# Patient Record
Sex: FEMALE | Race: WHITE | Employment: UNEMPLOYED | URBAN - METROPOLITAN AREA
[De-identification: names, ages, dates, MRNs, and addresses within clinical notes are randomized per-mention and may not be internally consistent; named-entity substitution may affect disease eponyms.]

---

## 2017-03-03 ENCOUNTER — OFFICE VISIT (OUTPATIENT)
Dept: INTERNAL MEDICINE CLINIC | Age: 42
End: 2017-03-03

## 2017-03-03 VITALS
WEIGHT: 224 LBS | HEART RATE: 85 BPM | RESPIRATION RATE: 18 BRPM | DIASTOLIC BLOOD PRESSURE: 88 MMHG | HEIGHT: 66 IN | TEMPERATURE: 97.8 F | OXYGEN SATURATION: 98 % | BODY MASS INDEX: 36 KG/M2 | SYSTOLIC BLOOD PRESSURE: 131 MMHG

## 2017-03-03 DIAGNOSIS — E66.9 OBESITY (BMI 35.0-39.9 WITHOUT COMORBIDITY): ICD-10-CM

## 2017-03-03 DIAGNOSIS — Z01.419 WELL WOMAN EXAM: ICD-10-CM

## 2017-03-03 DIAGNOSIS — R10.11 RUQ PAIN: ICD-10-CM

## 2017-03-03 DIAGNOSIS — Z76.89 ENCOUNTER TO ESTABLISH CARE WITH NEW DOCTOR: ICD-10-CM

## 2017-03-03 DIAGNOSIS — Z01.419 WELL WOMAN EXAM: Primary | ICD-10-CM

## 2017-03-03 LAB
A/G RATIO: 1.3 (ref 1.1–2.2)
ALBUMIN SERPL-MCNC: 4.4 G/DL (ref 3.4–5)
ALP BLD-CCNC: 85 U/L (ref 40–129)
ALT SERPL-CCNC: 26 U/L (ref 10–40)
ANION GAP SERPL CALCULATED.3IONS-SCNC: 20 MMOL/L (ref 3–16)
AST SERPL-CCNC: 18 U/L (ref 15–37)
BILIRUB SERPL-MCNC: <0.2 MG/DL (ref 0–1)
BUN BLDV-MCNC: 10 MG/DL (ref 7–20)
CALCIUM SERPL-MCNC: 9.5 MG/DL (ref 8.3–10.6)
CHLORIDE BLD-SCNC: 102 MMOL/L (ref 99–110)
CHOLESTEROL, TOTAL: 251 MG/DL (ref 0–199)
CO2: 20 MMOL/L (ref 21–32)
CREAT SERPL-MCNC: 0.7 MG/DL (ref 0.6–1.1)
GFR AFRICAN AMERICAN: >60
GFR NON-AFRICAN AMERICAN: >60
GLOBULIN: 3.4 G/DL
GLUCOSE BLD-MCNC: 96 MG/DL (ref 70–99)
HDLC SERPL-MCNC: 43 MG/DL (ref 40–60)
LDL CHOLESTEROL CALCULATED: 171 MG/DL
POTASSIUM SERPL-SCNC: 4.4 MMOL/L (ref 3.5–5.1)
SODIUM BLD-SCNC: 142 MMOL/L (ref 136–145)
T4 FREE: 0.7 NG/DL (ref 0.9–1.8)
TOTAL PROTEIN: 7.8 G/DL (ref 6.4–8.2)
TRIGL SERPL-MCNC: 183 MG/DL (ref 0–150)
TSH REFLEX FT4: 6 UIU/ML (ref 0.27–4.2)
VLDLC SERPL CALC-MCNC: 37 MG/DL

## 2017-03-03 PROCEDURE — 99396 PREV VISIT EST AGE 40-64: CPT | Performed by: INTERNAL MEDICINE

## 2017-03-03 RX ORDER — NITROFURANTOIN 25; 75 MG/1; MG/1
CAPSULE ORAL
COMMUNITY
Start: 2017-01-19 | End: 2020-11-09

## 2017-03-06 PROBLEM — E78.2 MIXED HYPERLIPIDEMIA: Status: ACTIVE | Noted: 2017-03-06

## 2017-03-06 PROBLEM — E03.9 HYPOTHYROIDISM (ACQUIRED): Status: ACTIVE | Noted: 2017-03-06

## 2017-04-17 ENCOUNTER — TELEPHONE (OUTPATIENT)
Dept: BARIATRICS/WEIGHT MGMT | Age: 42
End: 2017-04-17

## 2018-04-06 DIAGNOSIS — E03.9 HYPOTHYROIDISM (ACQUIRED): Primary | ICD-10-CM

## 2018-04-06 RX ORDER — LEVOTHYROXINE SODIUM 75 UG/1
75 CAPSULE ORAL DAILY
Qty: 90 CAPSULE | Refills: 1 | Status: SHIPPED | OUTPATIENT
Start: 2018-04-06 | End: 2018-09-07 | Stop reason: SDUPTHER

## 2018-04-16 ENCOUNTER — OFFICE VISIT (OUTPATIENT)
Dept: INTERNAL MEDICINE CLINIC | Age: 43
End: 2018-04-16

## 2018-04-16 VITALS
WEIGHT: 217 LBS | RESPIRATION RATE: 16 BRPM | DIASTOLIC BLOOD PRESSURE: 74 MMHG | HEIGHT: 65 IN | HEART RATE: 79 BPM | SYSTOLIC BLOOD PRESSURE: 115 MMHG | BODY MASS INDEX: 36.15 KG/M2

## 2018-04-16 DIAGNOSIS — Z00.00 WELL ADULT EXAM: Primary | ICD-10-CM

## 2018-04-16 PROCEDURE — 99396 PREV VISIT EST AGE 40-64: CPT | Performed by: INTERNAL MEDICINE

## 2018-04-16 RX ORDER — LORATADINE 10 MG/1
10 TABLET ORAL DAILY
COMMUNITY

## 2018-04-16 RX ORDER — LEVOTHYROXINE SODIUM 0.07 MG/1
TABLET ORAL
COMMUNITY
Start: 2018-04-08 | End: 2018-04-16 | Stop reason: SDUPTHER

## 2018-04-16 ASSESSMENT — PATIENT HEALTH QUESTIONNAIRE - PHQ9
1. LITTLE INTEREST OR PLEASURE IN DOING THINGS: 0
SUM OF ALL RESPONSES TO PHQ9 QUESTIONS 1 & 2: 0
2. FEELING DOWN, DEPRESSED OR HOPELESS: 0
SUM OF ALL RESPONSES TO PHQ QUESTIONS 1-9: 0

## 2018-07-03 DIAGNOSIS — Z00.00 WELL ADULT EXAM: ICD-10-CM

## 2018-07-03 LAB
A/G RATIO: 1.3 (ref 1.1–2.2)
ALBUMIN SERPL-MCNC: 4.1 G/DL (ref 3.4–5)
ALP BLD-CCNC: 70 U/L (ref 40–129)
ALT SERPL-CCNC: 20 U/L (ref 10–40)
ANION GAP SERPL CALCULATED.3IONS-SCNC: 12 MMOL/L (ref 3–16)
AST SERPL-CCNC: 12 U/L (ref 15–37)
BASOPHILS ABSOLUTE: 0.1 K/UL (ref 0–0.2)
BASOPHILS RELATIVE PERCENT: 0.7 %
BILIRUB SERPL-MCNC: 0.3 MG/DL (ref 0–1)
BUN BLDV-MCNC: 12 MG/DL (ref 7–20)
CALCIUM SERPL-MCNC: 9.4 MG/DL (ref 8.3–10.6)
CHLORIDE BLD-SCNC: 103 MMOL/L (ref 99–110)
CHOLESTEROL, TOTAL: 188 MG/DL (ref 0–199)
CO2: 23 MMOL/L (ref 21–32)
CREAT SERPL-MCNC: 0.7 MG/DL (ref 0.6–1.1)
EOSINOPHILS ABSOLUTE: 0.2 K/UL (ref 0–0.6)
EOSINOPHILS RELATIVE PERCENT: 2.5 %
GFR AFRICAN AMERICAN: >60
GFR NON-AFRICAN AMERICAN: >60
GLOBULIN: 3.2 G/DL
GLUCOSE BLD-MCNC: 91 MG/DL (ref 70–99)
HCT VFR BLD CALC: 38.5 % (ref 36–48)
HDLC SERPL-MCNC: 32 MG/DL (ref 40–60)
HEMOGLOBIN: 12.9 G/DL (ref 12–16)
LDL CHOLESTEROL CALCULATED: 127 MG/DL
LYMPHOCYTES ABSOLUTE: 2.2 K/UL (ref 1–5.1)
LYMPHOCYTES RELATIVE PERCENT: 27.5 %
MCH RBC QN AUTO: 28.4 PG (ref 26–34)
MCHC RBC AUTO-ENTMCNC: 33.5 G/DL (ref 31–36)
MCV RBC AUTO: 84.7 FL (ref 80–100)
MONOCYTES ABSOLUTE: 0.5 K/UL (ref 0–1.3)
MONOCYTES RELATIVE PERCENT: 6.2 %
NEUTROPHILS ABSOLUTE: 5.1 K/UL (ref 1.7–7.7)
NEUTROPHILS RELATIVE PERCENT: 63.1 %
PDW BLD-RTO: 14.4 % (ref 12.4–15.4)
PLATELET # BLD: 287 K/UL (ref 135–450)
PMV BLD AUTO: 9.3 FL (ref 5–10.5)
POTASSIUM SERPL-SCNC: 4.4 MMOL/L (ref 3.5–5.1)
RBC # BLD: 4.54 M/UL (ref 4–5.2)
SODIUM BLD-SCNC: 138 MMOL/L (ref 136–145)
TOTAL PROTEIN: 7.3 G/DL (ref 6.4–8.2)
TRIGL SERPL-MCNC: 144 MG/DL (ref 0–150)
TSH REFLEX FT4: 2.72 UIU/ML (ref 0.27–4.2)
VLDLC SERPL CALC-MCNC: 29 MG/DL
WBC # BLD: 8.1 K/UL (ref 4–11)

## 2018-07-04 LAB
ESTIMATED AVERAGE GLUCOSE: 102.5 MG/DL
HBA1C MFR BLD: 5.2 %

## 2018-08-08 ENCOUNTER — HOSPITAL ENCOUNTER (OUTPATIENT)
Dept: MAMMOGRAPHY | Age: 43
Discharge: HOME | End: 2018-08-08
Attending: OBSTETRICS & GYNECOLOGY | Admitting: OBSTETRICS & GYNECOLOGY

## 2018-08-08 DIAGNOSIS — Z12.31 VISIT FOR SCREENING MAMMOGRAM: ICD-10-CM

## 2019-12-16 DIAGNOSIS — E03.9 HYPOTHYROIDISM (ACQUIRED): ICD-10-CM

## 2019-12-19 RX ORDER — LEVOTHYROXINE SODIUM 0.07 MG/1
TABLET ORAL
Qty: 90 TABLET | Refills: 2 | OUTPATIENT
Start: 2019-12-19

## 2020-11-09 ENCOUNTER — APPOINTMENT (OUTPATIENT)
Dept: GENERAL RADIOLOGY | Age: 45
End: 2020-11-09
Payer: COMMERCIAL

## 2020-11-09 ENCOUNTER — HOSPITAL ENCOUNTER (EMERGENCY)
Age: 45
Discharge: HOME | End: 2020-11-09
Attending: EMERGENCY MEDICINE
Payer: COMMERCIAL

## 2020-11-09 VITALS
RESPIRATION RATE: 14 BRPM | TEMPERATURE: 98.6 F | WEIGHT: 210 LBS | BODY MASS INDEX: 34.99 KG/M2 | HEIGHT: 65 IN | OXYGEN SATURATION: 98 % | SYSTOLIC BLOOD PRESSURE: 146 MMHG | HEART RATE: 70 BPM | DIASTOLIC BLOOD PRESSURE: 83 MMHG

## 2020-11-09 PROCEDURE — 90715 TDAP VACCINE 7 YRS/> IM: CPT | Performed by: EMERGENCY MEDICINE

## 2020-11-09 PROCEDURE — 90471 IMMUNIZATION ADMIN: CPT | Performed by: EMERGENCY MEDICINE

## 2020-11-09 PROCEDURE — 73130 X-RAY EXAM OF HAND: CPT

## 2020-11-09 PROCEDURE — 6370000000 HC RX 637 (ALT 250 FOR IP): Performed by: EMERGENCY MEDICINE

## 2020-11-09 PROCEDURE — 99283 EMERGENCY DEPT VISIT LOW MDM: CPT

## 2020-11-09 PROCEDURE — 6360000002 HC RX W HCPCS: Performed by: EMERGENCY MEDICINE

## 2020-11-09 RX ORDER — HYDROCODONE BITARTRATE AND ACETAMINOPHEN 5; 325 MG/1; MG/1
1 TABLET ORAL ONCE
Status: COMPLETED | OUTPATIENT
Start: 2020-11-09 | End: 2020-11-09

## 2020-11-09 RX ORDER — IBUPROFEN 400 MG/1
400 TABLET ORAL EVERY 6 HOURS PRN
Qty: 30 TABLET | Refills: 0 | Status: SHIPPED | OUTPATIENT
Start: 2020-11-09

## 2020-11-09 RX ADMIN — TETANUS TOXOID, REDUCED DIPHTHERIA TOXOID AND ACELLULAR PERTUSSIS VACCINE, ADSORBED 0.5 ML: 5; 2.5; 8; 8; 2.5 SUSPENSION INTRAMUSCULAR at 18:33

## 2020-11-09 RX ADMIN — HYDROCODONE BITARTRATE AND ACETAMINOPHEN 1 TABLET: 5; 325 TABLET ORAL at 19:48

## 2020-11-09 ASSESSMENT — PAIN DESCRIPTION - PAIN TYPE
TYPE: ACUTE PAIN
TYPE: ACUTE PAIN

## 2020-11-09 ASSESSMENT — PAIN DESCRIPTION - LOCATION
LOCATION: FINGER (COMMENT WHICH ONE)
LOCATION: OTHER (COMMENT)

## 2020-11-09 ASSESSMENT — PAIN DESCRIPTION - ORIENTATION
ORIENTATION: LEFT
ORIENTATION: LEFT

## 2020-11-09 ASSESSMENT — PAIN - FUNCTIONAL ASSESSMENT
PAIN_FUNCTIONAL_ASSESSMENT: 0-10
PAIN_FUNCTIONAL_ASSESSMENT: PREVENTS OR INTERFERES SOME ACTIVE ACTIVITIES AND ADLS

## 2020-11-09 ASSESSMENT — PAIN SCALES - GENERAL
PAINLEVEL_OUTOF10: 6
PAINLEVEL_OUTOF10: 5
PAINLEVEL_OUTOF10: 5

## 2020-11-09 ASSESSMENT — PAIN DESCRIPTION - FREQUENCY: FREQUENCY: CONTINUOUS

## 2020-11-09 ASSESSMENT — PAIN DESCRIPTION - DESCRIPTORS: DESCRIPTORS: SHARP

## 2020-11-09 NOTE — ED PROVIDER NOTES
CHIEF COMPLAINT  Chief Complaint   Patient presents with    Fall     She was taking a walk and did not see a curve covered with leaves and fell forward injuring left 5th finger, and has upper lip abrasion, teeth intact, denies LOC, happened 30 minutes pta    Hand Injury       HISTORY OF PRESENT ILLNESS  Bekah Romero is a 54 y.o. female who presents to the ED complaining of injury to the left fifth digit when she fell and tried to catch herself with an outstretched hand. Patient reports pain and deformity of the left fifth digit primarily over the PIP joint with inability to extend the PIP joint at the time of evaluation. Patient denies pain of the remainder of the hand or wrist and her only other complaint is a abrasion to the upper lip without loose teeth. No loss of consciousness or headache. No otorrhea or rhinorrhea. No epistaxis or visual changes. No amnesia, nausea or vomiting. No neurologic deficits. No other complaints, modifying factors or associated symptoms. Nursing notes reviewed. History reviewed. No pertinent past medical history. History reviewed. No pertinent surgical history. History reviewed. No pertinent family history.   Social History     Socioeconomic History    Marital status:      Spouse name: Not on file    Number of children: Not on file    Years of education: Not on file    Highest education level: Not on file   Occupational History    Not on file   Social Needs    Financial resource strain: Not on file    Food insecurity     Worry: Not on file     Inability: Not on file    Transportation needs     Medical: Not on file     Non-medical: Not on file   Tobacco Use    Smoking status: Former Smoker     Types: Cigarettes     Last attempt to quit:      Years since quittin.8    Smokeless tobacco: Never Used   Substance and Sexual Activity    Alcohol use: Not on file     Comment: occasionally    Drug use: Never    Sexual activity: Not on file Lifestyle    Physical activity     Days per week: Not on file     Minutes per session: Not on file    Stress: Not on file   Relationships    Social connections     Talks on phone: Not on file     Gets together: Not on file     Attends [employer] service: Not on file     Active member of club or organization: Not on file     Attends meetings of clubs or organizations: Not on file     Relationship status: Not on file    Intimate partner violence     Fear of current or ex partner: Not on file     Emotionally abused: Not on file     Physically abused: Not on file     Forced sexual activity: Not on file   Other Topics Concern    Not on file   Social History Narrative    Not on file     No current facility-administered medications for this encounter. Current Outpatient Medications   Medication Sig Dispense Refill    ibuprofen (IBU) 400 MG tablet Take 1 tablet by mouth every 6 hours as needed for Pain 30 tablet 0    loratadine (CLARITIN) 10 MG tablet Take 10 mg by mouth daily      levothyroxine (SYNTHROID) 75 MCG tablet TAKE ONE TABLET BY MOUTH DAILY 90 tablet 2     No Known Allergies    REVIEW OF SYSTEMS  Positives and pertinent negatives as per HPI. Six other systems were reviewed and are negative. Nursing notes pertaining to ROS were reviewed. PHYSICAL EXAM   BP (!) 146/83   Pulse 70   Temp 98.6 °F (37 °C) (Oral)   Resp 14   Ht 5' 5\" (1.651 m)   Wt 210 lb (95.3 kg)   LMP 10/09/2020   SpO2 98%   BMI 34.95 kg/m²   GENERAL APPEARANCE: Awake and alert. Cooperative. No acute distress. HEAD: Normocephalic. Superficial abrasion to the midline upper lip with no evidence of fractured teeth and patient has a normal midface with no evidence of [place] fracture. Mild abrasion over the midline nasal bridge without nasal bone tenderness or crepitus. No septal hematoma or epistaxis. No mandible tenderness. No trismus. No hemotympanum. Negative Cormier's and raccoon sign.   EYES: PERRL. EOM's grossly intact. No scleral icterus, injection or exudate  ENT: Mucous membranes are moist.  NECK: Supple. Normal ROM. Nontender to palpation  CHEST:  Regular rate and rhythm, no murmurs, rubs or gallops  LUNGS: Breathing is unlabored. Speaking comfortably in full sentences. Clear through auscultation bilaterally  ABDOMEN: Nondistended, nontender  EXTREMITIES: No tenderness to palpation of the left shoulder, elbow or wrist.  Patient has no tenderness to palpation of the left hand or digits 1 through 4 but patient does have an abrasion with swelling and deformity over the left fifth PIP joint that is held in flexion at rest.  Patient's left PIP joint easily extends to full extension at the PIP joint but she is unable to maintain this position. SKIN: Warm and dry. NEUROLOGICAL: Alert and oriented. RADIOLOGY    XR HAND LEFT (MIN 3 VIEWS)   Final Result   5th PIP extensor tendon avulsion injury. ED COURSE/MDM  Lip abrasion: Wound care was provided in the emergency room and tetanus status was updated. No evidence of facial fracture or dental injury. Wound precautions were given    Left fifth digit PIP joint extensor tendon rupture: Patient's left PIP joint easily extends but does not remain in extension indicating a complete rupture of the extensor slip. Patient will be placed in a low form splint in position of function/extension. Ibuprofen as needed. RICE. Follow-up with Dr. Toro White from hand surgery in 24 to 48 hours. Hypertension:  Patient was hypertensive during the ER visit today. There are no signs of hypertensive emergency or evidence of end organ damage by history or physical exam.  These findings were discussed with the patient and advised to follow up with primary care physician to further assess and treat hypertension in the outpatient setting. The patient's blood pressure was found to be elevated according to CMS/Medicare and the Affordable Care Act/ObamaCare criteria.

## 2020-11-10 NOTE — ED NOTES
Gave patient discharge instructions.  She states, understanding patient discharged to home     Brennan Humphrey RN  11/09/20 1958

## 2020-11-10 NOTE — ED NOTES
Cleaned upper lip laceration and laceration left pinky finger. Applied triple ATB to both area. Applied non stick dressing, finger splint,  kerlix wrap, and ace wrap.  Patient c/o pain and requested pain medication       Charlie Jolley RN  11/09/20 [id number]

## 2020-11-10 NOTE — ED NOTES
Patient was going on a walk and did not see the curb covered with leaves and she fell forward, incident happened 30 minutes pta. C/o pain left pinky finger, unable to extended finger,  and noted skin abrasion to upper lip. Alert and oriented x 4.  Resp easy, Denies other injury     Medhat Howell RN  11/09/20 3509

## 2020-11-13 ENCOUNTER — TELEPHONE (OUTPATIENT)
Dept: ORTHOPEDIC SURGERY | Age: 45
End: 2020-11-13

## 2020-11-13 ENCOUNTER — OFFICE VISIT (OUTPATIENT)
Dept: ORTHOPEDIC SURGERY | Age: 45
End: 2020-11-13
Payer: COMMERCIAL

## 2020-11-13 VITALS — RESPIRATION RATE: 16 BRPM | WEIGHT: 210 LBS | TEMPERATURE: 98.1 F | BODY MASS INDEX: 34.99 KG/M2 | HEIGHT: 65 IN

## 2020-11-13 PROBLEM — M20.022 BOUTONNIERE DEFORMITY OF FINGER OF LEFT HAND: Status: ACTIVE | Noted: 2020-11-13

## 2020-11-13 PROCEDURE — 99203 OFFICE O/P NEW LOW 30 MIN: CPT | Performed by: PHYSICIAN ASSISTANT

## 2020-11-13 PROCEDURE — G8417 CALC BMI ABV UP PARAM F/U: HCPCS | Performed by: PHYSICIAN ASSISTANT

## 2020-11-13 PROCEDURE — G8427 DOCREV CUR MEDS BY ELIG CLIN: HCPCS | Performed by: PHYSICIAN ASSISTANT

## 2020-11-13 PROCEDURE — G8484 FLU IMMUNIZE NO ADMIN: HCPCS | Performed by: PHYSICIAN ASSISTANT

## 2020-11-13 PROCEDURE — 1036F TOBACCO NON-USER: CPT | Performed by: PHYSICIAN ASSISTANT

## 2020-11-13 NOTE — TELEPHONE ENCOUNTER
I called and left a  for patient to callback and discuss her therapy referral to Louann Joseph from [facility]. She can go to another therapist listed on the order if that works better with her insurance.

## 2020-11-13 NOTE — PATIENT INSTRUCTIONS
Thank you for choosing [facility] for your Hand and Upper Extremity needs. If we can be of any further assistance to you, please do not hesitate to contact us.     Office Phone Number:  [phone]  or  [phone]

## 2020-11-13 NOTE — PROGRESS NOTES
Ms. Catrina Law is a 54 y.o. right handed woman  who is seen today in Hand Surgical Consultation at the request of No primary care provider on file. .    She is seen today regarding an injury occurring on November 9th, 2020. She reports injuring her left Small Finger, having Tripped on a curb while walking. At the time of injury, there was malposition of the finger with a flexed PIP joint. She was seen for evaluation elsewhere, radiographs were obtained & she has been immobilized. She states that she took her splint off the day after she went to the ED because it was uncomfortable. She reports mild pain located in the distal aspect of the Small Finger, no tenderness of the remaining hand, wrist, or elbow. She notes today, no neurologic symptoms in the Small Finger. Symptoms show no change over time. I have today reviewed with Catrina Law the clinically relevant, past medical history, medications, allergies,  family history, social history, and Review Of Systems & I have documented any details relevant to today's presenting complaints in my history above. Ms. Jessica Duffy's self-reported past medical history, medications, allergies,  family history, social history, and Review Of Systems have been scanned into the chart under the \"Media\" tab. Physical Exam:  Ms. Marquita Rodríguez most recent vitals:  Vitals  Temp: 98.1 °F (36.7 °C)  Temp Source: Infrared  Resp: 16  Height: 5' 5\" (165.1 cm)  Weight: 210 lb (95.3 kg)    She is well nourished, oriented to person, place & time. She demonstrates appropriate mood and affect as well as normal gait and station. Skin: Normal in appearance, Normal Color and Free of Lesions Bilaterally   Digital range of motion is normal bilaterally except in the Small Finger where the PIP joint shows no active extension and sits in a flexed position. DIP joint sits in a hyperextended position, able to be passively flexed.  .   Wrist range of motion is Full and equal mal-union, non-union, delayed union, persistent deformity, persistent pain, limitation of motion, future arthritic symptoms & the possible need for further treatment. She understood our discussion and was comfortable with her decision; she was provided with appropriate expectations. .We discussed the option of pursuing formalized hand therapy and a prescription was provided to have a custom made splint made. Ms. Catrina Law. Is instructed in the continuous wear of the splint 24 hours a day without its removal for a period of 8 weeks. She is instructed to contact the office if she is unable to keep the splint in place or if it becomes dislodged, malpositioned, or otherwise unserviceable. I have asked her to schedule a follow-up appointment for 8 weeks from now at which time we will evaluate her healing. She is specifically instructed to contact the office between now & her scheduled appointment if she has concerns related to the splint or the underlying fracture. She is welcome to call for an appointment sooner if she has any additional concerns or questions. I have also discussed with Ms. Catrina Law  the other treatment options available to her  for this condition. We have today selected to proceed with conservative management. She and I have agreed that if our current course of conservative treatment does not prove to be effective over the short term future, that she will schedule a follow-up appointment to discuss and select an alternate course of therapy including possibly injection or surgical treatment. Ms. Catrina Law has been given a full verbal list of instructions and precautions related to her present condition. I have asked her to followup with me in the office at the prescribed time. She is also specifically requested to call or return to the office sooner if her symptoms change or worsen prior to the next scheduled appointment.

## 2020-11-16 NOTE — TELEPHONE ENCOUNTER
I spoke with patient and received her [email or web address] [email or web address] to send her other therapy locations.

## 2020-12-16 ENCOUNTER — OFFICE VISIT (OUTPATIENT)
Dept: ORTHOPEDIC SURGERY | Age: 45
End: 2020-12-16
Payer: COMMERCIAL

## 2020-12-16 VITALS — TEMPERATURE: 97.9 F | WEIGHT: 210 LBS | HEIGHT: 65 IN | BODY MASS INDEX: 34.99 KG/M2

## 2020-12-16 PROCEDURE — G8427 DOCREV CUR MEDS BY ELIG CLIN: HCPCS | Performed by: ORTHOPAEDIC SURGERY

## 2020-12-16 PROCEDURE — 99213 OFFICE O/P EST LOW 20 MIN: CPT | Performed by: ORTHOPAEDIC SURGERY

## 2020-12-16 PROCEDURE — G8417 CALC BMI ABV UP PARAM F/U: HCPCS | Performed by: ORTHOPAEDIC SURGERY

## 2020-12-16 PROCEDURE — 1036F TOBACCO NON-USER: CPT | Performed by: ORTHOPAEDIC SURGERY

## 2020-12-16 PROCEDURE — G8484 FLU IMMUNIZE NO ADMIN: HCPCS | Performed by: ORTHOPAEDIC SURGERY

## 2020-12-16 NOTE — PROGRESS NOTES
CHIEF COMPLAINT: Left hand pain/ short (pinkie) finger Boutonniere deformity. DATE OF INJURY: 2020    HISTORY:  Ms. Jacob Connors 54 y.o.  female right handed presents today for the first visit for evaluation of a left hand injury which occurred when she she fell walking her dog who was walking in leaves and did not know that the curb was there and tripped falling on her left hand. She initially presented to [facility] on 2020 where she was evaluated, x-rayed and instructed to follow-up with orthopedics. She did schedule an appointment with [place] Fournier who works with Dr. Wilberto Mcdonough and she sent her to physical therapy and was placed in a brace. She did not go to PT. She is here today with the same complaints. She is complaining of short (pinkie) finger pain and swelling. This is better with elevation and worse with ROM. Rates pain a 1/10 VAS with rest, but can increase to a 10/10 VAS when trying to bend or straighten her finger. The pain is sharp and not radiating. She is still wearing a finger brace. No other complaint. She denies smoking. History reviewed. No pertinent past medical history. History reviewed. No pertinent surgical history.     Social History     Socioeconomic History    Marital status:      Spouse name: Not on file    Number of children: Not on file    Years of education: Not on file    Highest education level: Not on file   Occupational History    Not on file   Social Needs    Financial resource strain: Not on file    Food insecurity     Worry: Not on file     Inability: Not on file    Transportation needs     Medical: Not on file     Non-medical: Not on file   Tobacco Use    Smoking status: Former Smoker     Types: Cigarettes     Quit date:      Years since quittin.9    Smokeless tobacco: Never Used   Substance and Sexual Activity    Alcohol use: Yes     Comment: occasionally    Drug use: Never    Sexual activity: Not on file   Lifestyle  Physical activity     Days per week: Not on file     Minutes per session: Not on file    Stress: Not on file   Relationships    Social connections     Talks on phone: Not on file     Gets together: Not on file     Attends [employer] service: Not on file     Active member of club or organization: Not on file     Attends meetings of clubs or organizations: Not on file     Relationship status: Not on file    Intimate partner violence     Fear of current or ex partner: Not on file     Emotionally abused: Not on file     Physically abused: Not on file     Forced sexual activity: Not on file   Other Topics Concern    Not on file   Social History Narrative    Not on file       History reviewed. No pertinent family history. Current Outpatient Medications on File Prior to Visit   Medication Sig Dispense Refill    ibuprofen (IBU) 400 MG tablet Take 1 tablet by mouth every 6 hours as needed for Pain 30 tablet 0    loratadine (CLARITIN) 10 MG tablet Take 10 mg by mouth daily       No current facility-administered medications on file prior to visit. Pertinent items are noted in HPI  Review of systems reviewed from Patient History Form dated on 11/13/2020 and available in the patient's chart under the Media tab. No change noted. PHYSICAL EXAMINATION:  Ms. Margarito العراقي is a very pleasant 54 y.o.  female who presents today in no acute distress, awake, alert, and oriented. She is well dressed, nourished and  groomed. Patient with normal affect. Height is  5' 5\" (1.651 m), weight is 210 lb (95.3 kg), Body mass index is 34.95 kg/m². Resting respiratory rate is 16. Examination of the gait, showed that the patient walks with no limp . Examination of both upper extremities showing a decreased range of motion of the left short (pinkie) finger compare to the other side. There is mild swelling that can be seen, as well as ecchymosis of the short (pinkie) finger. She has intact sensation and good radial pulses. She has moderate tenderness on deep palpation over the PIP jointphalanx of the short (pinkie) finger left hand. There is fixed deformity of the left PIP joint short (pinkie) finger. IMAGING: Flaquita Fisher were reviewed, dated 11/9/2020 taken in [facility],  3 views of the left hand, and showed 5th pinky finger PIP extensor tension avulsion injury. 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 PLAN:  I discussed that the findings and treatment options. At this point, recommend referral to Dr. Delgado Stewart for further treatment options. She states she has an appointment with [place] on 1/9/2021.       Stephania Archer MD

## 2020-12-26 ENCOUNTER — APPOINTMENT (OUTPATIENT)
Dept: GENERAL RADIOLOGY | Age: 45
DRG: 137 | End: 2020-12-26
Payer: COMMERCIAL

## 2020-12-26 ENCOUNTER — APPOINTMENT (OUTPATIENT)
Dept: CT IMAGING | Age: 45
DRG: 137 | End: 2020-12-26
Payer: COMMERCIAL

## 2020-12-26 ENCOUNTER — HOSPITAL ENCOUNTER (INPATIENT)
Age: 45
LOS: 5 days | Discharge: HOME | DRG: 137 | End: 2020-12-31
Attending: INTERNAL MEDICINE | Admitting: INTERNAL MEDICINE
Payer: COMMERCIAL

## 2020-12-26 DIAGNOSIS — R09.02 HYPOXEMIA REQUIRING SUPPLEMENTAL OXYGEN: ICD-10-CM

## 2020-12-26 DIAGNOSIS — Z99.81 HYPOXEMIA REQUIRING SUPPLEMENTAL OXYGEN: ICD-10-CM

## 2020-12-26 DIAGNOSIS — U07.1 COVID-19 VIRUS INFECTION: Primary | ICD-10-CM

## 2020-12-26 DIAGNOSIS — R06.09 DYSPNEA ON EXERTION: ICD-10-CM

## 2020-12-26 PROBLEM — J96.01 ACUTE RESPIRATORY FAILURE WITH HYPOXEMIA (HCC): Status: ACTIVE | Noted: 2020-12-26

## 2020-12-26 PROBLEM — J12.82 PNEUMONIA DUE TO 2019 NOVEL CORONAVIRUS: Status: ACTIVE | Noted: 2020-12-26

## 2020-12-26 LAB
A/G RATIO: 0.9 (ref 1.1–2.2)
ABO/RH: NORMAL
ALBUMIN SERPL-MCNC: 3.5 G/DL (ref 3.4–5)
ALP BLD-CCNC: 188 U/L (ref 40–129)
ALT SERPL-CCNC: 68 U/L (ref 10–40)
ANION GAP SERPL CALCULATED.3IONS-SCNC: 14 MMOL/L (ref 3–16)
ANTIBODY SCREEN: NORMAL
AST SERPL-CCNC: 64 U/L (ref 15–37)
BASE EXCESS VENOUS: 0.5 MMOL/L
BASOPHILS ABSOLUTE: 0 K/UL (ref 0–0.2)
BASOPHILS RELATIVE PERCENT: 0.1 %
BILIRUB SERPL-MCNC: 0.3 MG/DL (ref 0–1)
BILIRUBIN URINE: NEGATIVE
BLOOD, URINE: NEGATIVE
BUN BLDV-MCNC: 10 MG/DL (ref 7–20)
C-REACTIVE PROTEIN: 310.3 MG/L (ref 0–5.1)
CALCIUM SERPL-MCNC: 8.8 MG/DL (ref 8.3–10.6)
CARBOXYHEMOGLOBIN: 1.9 %
CHLORIDE BLD-SCNC: 96 MMOL/L (ref 99–110)
CLARITY: CLEAR
CO2: 22 MMOL/L (ref 21–32)
COLOR: YELLOW
CREAT SERPL-MCNC: <0.5 MG/DL (ref 0.6–1.1)
D DIMER: 3021 NG/ML DDU (ref 0–229)
EOSINOPHILS ABSOLUTE: 0 K/UL (ref 0–0.6)
EOSINOPHILS RELATIVE PERCENT: 0.2 %
EPITHELIAL CELLS, UA: 5 /HPF (ref 0–5)
FIBRINOGEN: 885 MG/DL (ref 200–397)
GFR AFRICAN AMERICAN: >60
GFR NON-AFRICAN AMERICAN: >60
GLOBULIN: 4.1 G/DL
GLUCOSE BLD-MCNC: 106 MG/DL (ref 70–99)
GLUCOSE URINE: NEGATIVE MG/DL
HCO3 VENOUS: 24 MMOL/L (ref 23–29)
HCT VFR BLD CALC: 34 % (ref 36–48)
HEMOGLOBIN: 10.9 G/DL (ref 12–16)
HYALINE CASTS: 4 /LPF (ref 0–8)
INR BLD: 1.2 (ref 0.86–1.14)
KETONES, URINE: ABNORMAL MG/DL
LACTIC ACID: 1.2 MMOL/L (ref 0.4–2)
LEUKOCYTE ESTERASE, URINE: NEGATIVE
LYMPHOCYTES ABSOLUTE: 0.7 K/UL (ref 1–5.1)
LYMPHOCYTES RELATIVE PERCENT: 6.3 %
MCH RBC QN AUTO: 25.9 PG (ref 26–34)
MCHC RBC AUTO-ENTMCNC: 32.1 G/DL (ref 31–36)
MCV RBC AUTO: 80.5 FL (ref 80–100)
METHEMOGLOBIN VENOUS: 0.2 %
MICROSCOPIC EXAMINATION: YES
MONOCYTES ABSOLUTE: 1 K/UL (ref 0–1.3)
MONOCYTES RELATIVE PERCENT: 8 %
NEUTROPHILS ABSOLUTE: 10.1 K/UL (ref 1.7–7.7)
NEUTROPHILS RELATIVE PERCENT: 85.4 %
NITRITE, URINE: NEGATIVE
O2 CONTENT, VEN: 12 ML/DL
O2 SAT, VEN: 75 %
O2 THERAPY: ABNORMAL
PCO2, VEN: 35.7 MMHG (ref 40–50)
PDW BLD-RTO: 14.8 % (ref 12.4–15.4)
PH UA: 6 (ref 5–8)
PH VENOUS: 7.44 (ref 7.35–7.45)
PLATELET # BLD: 438 K/UL (ref 135–450)
PMV BLD AUTO: 7.1 FL (ref 5–10.5)
PO2, VEN: 40 MMHG
POTASSIUM REFLEX MAGNESIUM: 3.8 MMOL/L (ref 3.5–5.1)
PRO-BNP: 71 PG/ML (ref 0–124)
PROCALCITONIN: 0.14 NG/ML (ref 0–0.15)
PROTEIN UA: 30 MG/DL
PROTHROMBIN TIME: 13.9 SEC (ref 10–13.2)
RBC # BLD: 4.22 M/UL (ref 4–5.2)
RBC UA: 6 /HPF (ref 0–4)
SARS-COV-2, NAAT: DETECTED
SODIUM BLD-SCNC: 132 MMOL/L (ref 136–145)
SPECIFIC GRAVITY UA: >1.03 (ref 1–1.03)
TCO2 CALC VENOUS: 25 MMOL/L
TOTAL PROTEIN: 7.6 G/DL (ref 6.4–8.2)
TROPONIN: <0.01 NG/ML
URINE TYPE: ABNORMAL
UROBILINOGEN, URINE: 0.2 E.U./DL
VITAMIN D 25-HYDROXY: 15.9 NG/ML
WBC # BLD: 11.8 K/UL (ref 4–11)
WBC UA: 5 /HPF (ref 0–5)

## 2020-12-26 PROCEDURE — 85379 FIBRIN DEGRADATION QUANT: CPT

## 2020-12-26 PROCEDURE — 96374 THER/PROPH/DIAG INJ IV PUSH: CPT

## 2020-12-26 PROCEDURE — 82306 VITAMIN D 25 HYDROXY: CPT

## 2020-12-26 PROCEDURE — U0002 COVID-19 LAB TEST NON-CDC: HCPCS

## 2020-12-26 PROCEDURE — 6360000002 HC RX W HCPCS: Performed by: INTERNAL MEDICINE

## 2020-12-26 PROCEDURE — 87449 NOS EACH ORGANISM AG IA: CPT

## 2020-12-26 PROCEDURE — 86901 BLOOD TYPING SEROLOGIC RH(D): CPT

## 2020-12-26 PROCEDURE — 85384 FIBRINOGEN ACTIVITY: CPT

## 2020-12-26 PROCEDURE — 71045 X-RAY EXAM CHEST 1 VIEW: CPT

## 2020-12-26 PROCEDURE — 2060000000 HC ICU INTERMEDIATE R&B

## 2020-12-26 PROCEDURE — 83605 ASSAY OF LACTIC ACID: CPT

## 2020-12-26 PROCEDURE — 86140 C-REACTIVE PROTEIN: CPT

## 2020-12-26 PROCEDURE — 96375 TX/PRO/DX INJ NEW DRUG ADDON: CPT

## 2020-12-26 PROCEDURE — 83880 ASSAY OF NATRIURETIC PEPTIDE: CPT

## 2020-12-26 PROCEDURE — 85025 COMPLETE CBC W/AUTO DIFF WBC: CPT

## 2020-12-26 PROCEDURE — 80053 COMPREHEN METABOLIC PANEL: CPT

## 2020-12-26 PROCEDURE — 81001 URINALYSIS AUTO W/SCOPE: CPT

## 2020-12-26 PROCEDURE — 84145 PROCALCITONIN (PCT): CPT

## 2020-12-26 PROCEDURE — 94761 N-INVAS EAR/PLS OXIMETRY MLT: CPT

## 2020-12-26 PROCEDURE — 94664 DEMO&/EVAL PT USE INHALER: CPT

## 2020-12-26 PROCEDURE — 93005 ELECTROCARDIOGRAM TRACING: CPT | Performed by: NURSE PRACTITIONER

## 2020-12-26 PROCEDURE — 82803 BLOOD GASES ANY COMBINATION: CPT

## 2020-12-26 PROCEDURE — 6360000004 HC RX CONTRAST MEDICATION: Performed by: NURSE PRACTITIONER

## 2020-12-26 PROCEDURE — 2580000003 HC RX 258: Performed by: INTERNAL MEDICINE

## 2020-12-26 PROCEDURE — 86850 RBC ANTIBODY SCREEN: CPT

## 2020-12-26 PROCEDURE — 6360000002 HC RX W HCPCS: Performed by: NURSE PRACTITIONER

## 2020-12-26 PROCEDURE — 94640 AIRWAY INHALATION TREATMENT: CPT

## 2020-12-26 PROCEDURE — 6370000000 HC RX 637 (ALT 250 FOR IP): Performed by: INTERNAL MEDICINE

## 2020-12-26 PROCEDURE — 85610 PROTHROMBIN TIME: CPT

## 2020-12-26 PROCEDURE — 87040 BLOOD CULTURE FOR BACTERIA: CPT

## 2020-12-26 PROCEDURE — 86900 BLOOD TYPING SEROLOGIC ABO: CPT

## 2020-12-26 PROCEDURE — 2700000000 HC OXYGEN THERAPY PER DAY

## 2020-12-26 PROCEDURE — 99285 EMERGENCY DEPT VISIT HI MDM: CPT

## 2020-12-26 PROCEDURE — 84484 ASSAY OF TROPONIN QUANT: CPT

## 2020-12-26 PROCEDURE — 36415 COLL VENOUS BLD VENIPUNCTURE: CPT

## 2020-12-26 PROCEDURE — 71260 CT THORAX DX C+: CPT

## 2020-12-26 RX ORDER — ZINC SULFATE 50(220)MG
50 CAPSULE ORAL DAILY
Status: DISCONTINUED | OUTPATIENT
Start: 2020-12-26 | End: 2020-12-31 | Stop reason: HOSPADM

## 2020-12-26 RX ORDER — ONDANSETRON 2 MG/ML
4 INJECTION INTRAMUSCULAR; INTRAVENOUS EVERY 6 HOURS PRN
Status: DISCONTINUED | OUTPATIENT
Start: 2020-12-26 | End: 2020-12-31 | Stop reason: HOSPADM

## 2020-12-26 RX ORDER — KETOROLAC TROMETHAMINE 30 MG/ML
30 INJECTION, SOLUTION INTRAMUSCULAR; INTRAVENOUS ONCE
Status: COMPLETED | OUTPATIENT
Start: 2020-12-26 | End: 2020-12-26

## 2020-12-26 RX ORDER — PROMETHAZINE HYDROCHLORIDE 25 MG/1
12.5 TABLET ORAL EVERY 6 HOURS PRN
Status: DISCONTINUED | OUTPATIENT
Start: 2020-12-26 | End: 2020-12-31 | Stop reason: HOSPADM

## 2020-12-26 RX ORDER — AZITHROMYCIN 250 MG/1
250 TABLET, FILM COATED ORAL DAILY
Status: ON HOLD | COMMUNITY
Start: 2020-12-21 | End: 2020-12-31 | Stop reason: HOSPADM

## 2020-12-26 RX ORDER — LEVOTHYROXINE SODIUM 88 UG/1
88 TABLET ORAL DAILY
COMMUNITY
End: 2021-01-01 | Stop reason: SDUPTHER

## 2020-12-26 RX ORDER — METHYLPREDNISOLONE SODIUM SUCCINATE 125 MG/2ML
80 INJECTION, POWDER, LYOPHILIZED, FOR SOLUTION INTRAMUSCULAR; INTRAVENOUS ONCE
Status: COMPLETED | OUTPATIENT
Start: 2020-12-26 | End: 2020-12-26

## 2020-12-26 RX ORDER — AZITHROMYCIN 500 MG/1
500 TABLET, FILM COATED ORAL ONCE
Status: COMPLETED | OUTPATIENT
Start: 2020-12-26 | End: 2020-12-26

## 2020-12-26 RX ORDER — ALBUTEROL SULFATE 90 UG/1
2 AEROSOL, METERED RESPIRATORY (INHALATION) EVERY 6 HOURS PRN
COMMUNITY

## 2020-12-26 RX ORDER — ACETAMINOPHEN 325 MG/1
650 TABLET ORAL EVERY 6 HOURS PRN
Status: DISCONTINUED | OUTPATIENT
Start: 2020-12-26 | End: 2020-12-26 | Stop reason: SDUPTHER

## 2020-12-26 RX ORDER — SODIUM CHLORIDE 0.9 % (FLUSH) 0.9 %
10 SYRINGE (ML) INJECTION EVERY 12 HOURS SCHEDULED
Status: DISCONTINUED | OUTPATIENT
Start: 2020-12-26 | End: 2020-12-31 | Stop reason: HOSPADM

## 2020-12-26 RX ORDER — GUAIFENESIN/DEXTROMETHORPHAN 100-10MG/5
5 SYRUP ORAL EVERY 4 HOURS PRN
Status: DISCONTINUED | OUTPATIENT
Start: 2020-12-26 | End: 2020-12-31 | Stop reason: HOSPADM

## 2020-12-26 RX ORDER — VITAMIN B COMPLEX
6000 TABLET ORAL DAILY
Status: DISCONTINUED | OUTPATIENT
Start: 2020-12-26 | End: 2020-12-30 | Stop reason: RX

## 2020-12-26 RX ORDER — LEVOTHYROXINE SODIUM 88 UG/1
88 TABLET ORAL DAILY
Status: DISCONTINUED | OUTPATIENT
Start: 2020-12-26 | End: 2020-12-31 | Stop reason: HOSPADM

## 2020-12-26 RX ORDER — ALBUTEROL SULFATE 90 UG/1
2 AEROSOL, METERED RESPIRATORY (INHALATION) EVERY 6 HOURS PRN
Status: DISCONTINUED | OUTPATIENT
Start: 2020-12-26 | End: 2020-12-31 | Stop reason: HOSPADM

## 2020-12-26 RX ORDER — ACETAMINOPHEN 650 MG/1
650 SUPPOSITORY RECTAL EVERY 6 HOURS PRN
Status: DISCONTINUED | OUTPATIENT
Start: 2020-12-26 | End: 2020-12-31 | Stop reason: HOSPADM

## 2020-12-26 RX ORDER — SODIUM CHLORIDE 0.9 % (FLUSH) 0.9 %
10 SYRINGE (ML) INJECTION PRN
Status: DISCONTINUED | OUTPATIENT
Start: 2020-12-26 | End: 2020-12-31 | Stop reason: HOSPADM

## 2020-12-26 RX ORDER — ACETAMINOPHEN 325 MG/1
650 TABLET ORAL EVERY 6 HOURS PRN
Status: DISCONTINUED | OUTPATIENT
Start: 2020-12-26 | End: 2020-12-31 | Stop reason: HOSPADM

## 2020-12-26 RX ORDER — POLYETHYLENE GLYCOL 3350 17 G/17G
17 POWDER, FOR SOLUTION ORAL DAILY PRN
Status: DISCONTINUED | OUTPATIENT
Start: 2020-12-26 | End: 2020-12-31 | Stop reason: HOSPADM

## 2020-12-26 RX ORDER — DEXTROSE AND SODIUM CHLORIDE 5; .9 G/100ML; G/100ML
INJECTION, SOLUTION INTRAVENOUS CONTINUOUS
Status: DISCONTINUED | OUTPATIENT
Start: 2020-12-26 | End: 2020-12-26

## 2020-12-26 RX ORDER — DEXTROSE AND SODIUM CHLORIDE 5; .9 G/100ML; G/100ML
INJECTION, SOLUTION INTRAVENOUS CONTINUOUS
Status: ACTIVE | OUTPATIENT
Start: 2020-12-26 | End: 2020-12-27

## 2020-12-26 RX ORDER — AZITHROMYCIN 500 MG/1
250 TABLET, FILM COATED ORAL DAILY
Status: DISCONTINUED | OUTPATIENT
Start: 2020-12-27 | End: 2020-12-26

## 2020-12-26 RX ORDER — ACETAMINOPHEN 650 MG/1
650 SUPPOSITORY RECTAL EVERY 6 HOURS PRN
Status: DISCONTINUED | OUTPATIENT
Start: 2020-12-26 | End: 2020-12-26 | Stop reason: SDUPTHER

## 2020-12-26 RX ORDER — BENZONATATE 100 MG/1
100 CAPSULE ORAL 3 TIMES DAILY PRN
COMMUNITY
End: 2021-01-01 | Stop reason: SDUPTHER

## 2020-12-26 RX ORDER — BENZONATATE 100 MG/1
100 CAPSULE ORAL 3 TIMES DAILY PRN
Status: DISCONTINUED | OUTPATIENT
Start: 2020-12-26 | End: 2020-12-31 | Stop reason: HOSPADM

## 2020-12-26 RX ORDER — DEXAMETHASONE 4 MG/1
6 TABLET ORAL DAILY
Status: DISCONTINUED | OUTPATIENT
Start: 2020-12-26 | End: 2020-12-31 | Stop reason: HOSPADM

## 2020-12-26 RX ORDER — DEXTROSE AND SODIUM CHLORIDE 5; .9 G/100ML; G/100ML
INJECTION, SOLUTION INTRAVENOUS CONTINUOUS
Status: CANCELLED | OUTPATIENT
Start: 2020-12-26

## 2020-12-26 RX ADMIN — LEVOTHYROXINE SODIUM 88 MCG: 0.09 TABLET ORAL at 15:00

## 2020-12-26 RX ADMIN — IOPAMIDOL 75 ML: 755 INJECTION, SOLUTION INTRAVENOUS at 10:20

## 2020-12-26 RX ADMIN — ZINC SULFATE 220 MG (50 MG) CAPSULE 50 MG: CAPSULE at 15:00

## 2020-12-26 RX ADMIN — AZITHROMYCIN MONOHYDRATE 500 MG: 500 TABLET ORAL at 15:00

## 2020-12-26 RX ADMIN — SODIUM CHLORIDE, PRESERVATIVE FREE 10 ML: 5 INJECTION INTRAVENOUS at 20:42

## 2020-12-26 RX ADMIN — BENZONATATE 100 MG: 100 CAPSULE ORAL at 20:56

## 2020-12-26 RX ADMIN — METHYLPREDNISOLONE SODIUM SUCCINATE 80 MG: 125 INJECTION, POWDER, FOR SOLUTION INTRAMUSCULAR; INTRAVENOUS at 09:09

## 2020-12-26 RX ADMIN — ENOXAPARIN SODIUM 50 MG: 60 INJECTION SUBCUTANEOUS at 15:00

## 2020-12-26 RX ADMIN — Medication 6000 UNITS: at 14:59

## 2020-12-26 RX ADMIN — ENOXAPARIN SODIUM 50 MG: 60 INJECTION SUBCUTANEOUS at 20:41

## 2020-12-26 RX ADMIN — DEXTROSE AND SODIUM CHLORIDE: 5; 900 INJECTION, SOLUTION INTRAVENOUS at 14:59

## 2020-12-26 RX ADMIN — ALBUTEROL SULFATE 2 PUFF: 90 AEROSOL, METERED RESPIRATORY (INHALATION) at 22:00

## 2020-12-26 RX ADMIN — DEXAMETHASONE 6 MG: 4 TABLET ORAL at 15:00

## 2020-12-26 RX ADMIN — KETOROLAC TROMETHAMINE 30 MG: 30 INJECTION, SOLUTION INTRAMUSCULAR at 09:09

## 2020-12-26 ASSESSMENT — ENCOUNTER SYMPTOMS
COUGH: 1
VOMITING: 0
SHORTNESS OF BREATH: 1
CHEST TIGHTNESS: 1
DIARRHEA: 0
BACK PAIN: 0
NAUSEA: 0
ABDOMINAL PAIN: 0
WHEEZING: 0
COLOR CHANGE: 0

## 2020-12-26 ASSESSMENT — PAIN SCALES - GENERAL: PAINLEVEL_OUTOF10: 5

## 2020-12-26 NOTE — PROGRESS NOTES
Patient to room on 15l NRB, placed on 12l high flow. Telemetry and continuous pulsox in place, VSS. Patient a/o x4. Oriented to room and call light, declined non skid socks, will call for assist as needed.

## 2020-12-26 NOTE — ED PROVIDER NOTES
**ADVANCED PRACTICE PROVIDER, I HAVE EVALUATED THIS PATIENT**        [address] [facility] ENCOUNTER      Pt Name: Leonora MOBLEY:[id number]  [place] 1975  Date of evaluation: 2020  Provider: HUSAM Webb CNP      Chief Complaint:    Chief Complaint   Patient presents with    Shortness of Breath       Nursing Notes, Past Medical Hx, Past Surgical Hx, Social Hx, Allergies, and Family Hx were all reviewed and agreed with or any disagreements were addressed in the HPI.    HPI:  (Location, Duration, Timing, Severity, Quality, Assoc Sx, Context, Modifying factors)  This is a  54 y.o. female's emergency department with cough, chest congestion since the  of this month, she states that she does not have a history of chronic lung disease, she does report that she gets bronchitis every year, she talked with her PCP on Monday, they started her on antibiotics, Tessalon Perles and cough medicine however her shortness of breath is only getting worse. She reports no known fever or chills but does have some generalized body aches, shortness of breath that is worse on exertion. She states she called 911 today because she was just having a hard time breathing at home. She does complain of chest tightness however no cardiac chest pain or pleuritic chest pain. She denies any abdominal pain, she does report feeling nauseous, no appetite and inability to eat however denies any vomiting or diarrhea, no urinary symptoms. She denies any severe headache neck pain or neck stiffness. No additional complaints, no new medications, no additional aggravating relieving factors. Patient presents awake, alert, in acute respiratory distress, upon ED arrival she was on 6 L via EMS, saturations were only in the 90s, nurse staff took the patient off the oxygen, she immediately dropped to 81 to 82% on room air, she was placed on nonrebreather mask. PastMedical/Surgical History:  History reviewed. No pertinent past medical history. History reviewed. No pertinent surgical history. Medications:  Previous Medications    ALBUTEROL SULFATE HFA (VENTOLIN HFA) 108 (90 BASE) MCG/ACT INHALER    Inhale 2 puffs into the lungs every 6 hours as needed for Wheezing    AZITHROMYCIN (ZITHROMAX) 250 MG TABLET    Take 250 mg by mouth daily    BENZONATATE (TESSALON) 100 MG CAPSULE    Take 100 mg by mouth 3 times daily as needed for Cough    IBUPROFEN (IBU) 400 MG TABLET    Take 1 tablet by mouth every 6 hours as needed for Pain    LEVOTHYROXINE (SYNTHROID) 88 MCG TABLET    Take 88 mcg by mouth Daily    LORATADINE (CLARITIN) 10 MG TABLET    Take 10 mg by mouth daily         Review of Systems:  Review of Systems   Constitutional: Negative for chills and fever. HENT: Negative for congestion. Respiratory: Positive for cough, chest tightness and shortness of breath. Negative for wheezing. Patient presents with cough, chest congestion since the 11th of this month, she states that she does not have a history of chronic lung disease, she does report that she gets bronchitis every year, she talked with her PCP on Monday, they started her on antibiotics, Tessalon Perles and cough medicine however her shortness of breath is only getting worse. Cardiovascular: Negative for chest pain. Gastrointestinal: Negative for abdominal pain, diarrhea, nausea and vomiting. She denies abdominal pain, does report some slight nausea but no vomiting or diarrhea, reports decreased appetite not wanting to eat since being sick   Genitourinary: Negative for difficulty urinating, dysuria and vaginal bleeding. Musculoskeletal: Negative for back pain. Skin: Negative for color change. Neurological: Negative for weakness, numbness and headaches. Positives and Pertinent negatives as per HPI. Except as noted above in the ROS, problem specific ROS was completed and is negative. Physical Exam:  Physical Exam  Vitals signs and nursing note reviewed. Constitutional:       Appearance: She is well-developed. She is not diaphoretic. HENT:      Head: Normocephalic. Right Ear: External ear normal.      Left Ear: External ear normal.   Eyes:      General: No scleral icterus. Right eye: No discharge. Left eye: No discharge. Neck:      Musculoskeletal: Normal range of motion and neck supple. Cardiovascular:      Rate and Rhythm: Regular rhythm. Comments: Patient has normal S1 and 2, peripheral pulses are 2+, no obvious edema  Pulmonary:      Effort: Respiratory distress present. Comments: Patient is tachypneic and dyspneic breathing about 28 breaths/min during my exam, she is having a hard time talking during conversation, upon ED arrival she was on 6 L of high flow O2, this was discontinued she was placed on nonrebreather. Saturations improved to 94% on a nonrebreather mask. Abdominal:      General: Bowel sounds are normal.      Palpations: Abdomen is soft. Tenderness: There is no abdominal tenderness. Comments: Abdomen soft and nondistended. Bowel sounds are positive, no acute tenderness, guarding or rebound tenderness. No acute ascites or rigidity. Musculoskeletal: Normal range of motion. Skin:     General: Skin is warm. Capillary Refill: Capillary refill takes less than 2 seconds. Coloration: Skin is not pale. Neurological:      General: No focal deficit present. Mental Status: She is alert and oriented to person, place, and time. GCS: GCS eye subscore is 4. GCS verbal subscore is 5. GCS motor subscore is 6.    Psychiatric:         Behavior: Behavior normal.         MEDICAL DECISION MAKING    Vitals:    Vitals:    12/26/20 1015 12/26/20 1030 12/26/20 1045 12/26/20 1100   BP:  129/75     Pulse: 93 97 97 95   Resp: 27 21 27 22   Temp:  98.5 °F (36.9 °C)     TempSrc:  Oral     SpO2: 99% 96% 96% 97%   Weight: LABS:  Labs Reviewed   CBC WITH AUTO DIFFERENTIAL - Abnormal; Notable for the following components:       Result Value    WBC 11.8 (*)     Hemoglobin 10.9 (*)     Hematocrit 34.0 (*)     MCH 25.9 (*)     Neutrophils Absolute 10.1 (*)     Lymphocytes Absolute 0.7 (*)     All other components within normal limits    Narrative:     Performed at:  [address] [facility]   Phone [phone]   COMPREHENSIVE METABOLIC PANEL W/ REFLEX TO MG FOR LOW K - Abnormal; Notable for the following components:    Sodium 132 (*)     Chloride 96 (*)     Glucose 106 (*)     CREATININE <0.5 (*)     Albumin/Globulin Ratio 0.9 (*)     Alkaline Phosphatase 188 (*)     ALT 68 (*)     AST 64 (*)     All other components within normal limits    Narrative:     Performed at:  [address] [facility]   Phone [phone]   BLOOD GAS, VENOUS - Abnormal; Notable for the following components:    pCO2, Navid 35.7 (*)     All other components within normal limits    Narrative:     Performed at:  [address] [facility]   Phone [phone] - Abnormal; Notable for the following components:    SARS-CoV-2, NAAT DETECTED (*)     All other components within normal limits    Narrative:     Performed at:  [address] [facility]   Phone [phone]   CULTURE, BLOOD 1   CULTURE, BLOOD 2   RAPID INFLUENZA A/B ANTIGENS   LEGIONELLA ANTIGEN, URINE   STREP PNEUMONIAE ANTIGEN   BRAIN NATRIURETIC PEPTIDE    Narrative:     Performed at:  [address] [facility]   Phone [phone]   TROPONIN    Narrative:     Performed at:  [facility] acetaminophen (TYLENOL) tablet 650 mg (has no administration in time range)     Or   acetaminophen (TYLENOL) suppository 650 mg (has no administration in time range)   enoxaparin (LOVENOX) injection 50 mg (has no administration in time range)   guaiFENesin-dextromethorphan (ROBITUSSIN DM) 100-10 MG/5ML syrup 5 mL (has no administration in time range)   Vitamin D (CHOLECALCIFEROL) tablet 6,000 Units (has no administration in time range)   dexamethasone (DECADRON) tablet 6 mg (has no administration in time range)   azithromycin (ZITHROMAX) tablet 500 mg (has no administration in time range)     Followed by   azithromycin (ZITHROMAX) tablet 250 mg (has no administration in time range)   zinc sulfate (ZINCATE) capsule 50 mg (has no administration in time range)   methylPREDNISolone sodium (SOLU-MEDROL) injection 80 mg (80 mg Intravenous Given 12/26/20 0909)   ketorolac (TORADOL) injection 30 mg (30 mg Intravenous Given 12/26/20 0909)   iopamidol (ISOVUE-370) 76 % injection 75 mL (75 mLs Intravenous Given 12/26/20 1020)     Patient presents with cough, chest congestion since the 11th of this month, she states that she does not have a history of chronic lung disease, she does report that she gets bronchitis every year, she talked with her PCP on Monday, they started her on antibiotics, Tessalon Perles and cough medicine however her shortness of breath is only getting worse. She reports no known fever or chills but does have some generalized body aches, shortness of breath that is worse on exertion. She states she called 911 today because she was just having a hard time breathing at home. After evaluation and examination the patient IV access, blood work, chest x-ray, EKG, CT scan of the chest were ordered along with medications including Toradol for her generalized body aches and Solu-Medrol. However, upon ED arrival she was on 6 L via EMS, saturations were only in the 90s, nurse staff took the patient off the oxygen, she immediately dropped to 81 to 82% on room air, she was placed on nonrebreather mask. Chest x-ray shows multifocal airspace disease concerning for Covid. CBC shows no significant sepsis or anemia. Metabolic panel shows no significant electrolyte disturbances or renal failure. Liver functions are slightly bumped as alk phos is 188, ALT of 60 and AST of 64. However, BNP, troponin and lactic acid are negative or within normal limits. COVID-19 detected is positive. CT chest with no acute pulmonary emboli, however, she does have extensive multifocal bilateral atypical pneumonia most likely related to COVID-19. She was already given steroids. Upon reevaluation her vital signs are stable however she is unable to come off oxygen because she becomes more hypoxic and in acute respiratory distress, I then paged hospitalist on-call for admission. Therefore, patient was admitted to hospital services via perfect serve, patient will be admitted for further evaluation management of care. The patient tolerated their visit well. I evaluated the patient. The physician was available for consultation as needed. The patient and / or the family were informed of the results of any tests, a time was given to answer questions, a plan was proposed and they agreed with plan. CLINICAL IMPRESSION:  1. COVID-19 virus infection    2. Hypoxemia requiring supplemental oxygen    3. Dyspnea on exertion        DISPOSITION        PATIENT REFERRED TO:  No follow-up provider specified.     DISCHARGE MEDICATIONS:  New Prescriptions    No medications on file       DISCONTINUED MEDICATIONS: Discontinued Medications    No medications on file              (Please note the MDM and HPI sections of this note were completed with a voice recognition program.  Efforts were made to edit the dictations but occasionally words are mis-transcribed.)    Electronically signed, HUSAM Tran CNP,          HUSAM Tran CNP  12/26/20 1125

## 2020-12-26 NOTE — ED PROVIDER NOTES
EKG Interpretation    Interpreted by emergency department physician  Time read: 0011    Rhythm: Sinus tachycardia  Ventricular Rate: 102  QRS Axis: -22  Ectopy: None  Conduction: Sinus tachycardia with left axis deviation but does not meet the criteria for left anterior fascicular block  ST Segments: normal  T Waves: Diffusely flattened  Q Waves: None    Other findings: Motion artifact but EKG is readable    Compared to EKG on: None to compare    Clinical Impression: Sinus tachycardia with left axis deviation but does not quite meet criteria for left anterior fascicular block. T waves are diffusely flattened. There is motion artifact but EKG is readable. There is no old EKG to compare.     [facility] [place]  12/26/20 0819

## 2020-12-26 NOTE — PROGRESS NOTES
4 Eyes Skin Assessment     NAME:  Barber Knutson  YOB: 1975  MEDICAL RECORD NUMBER:  [id number]    The patient is being assess for  Admission    I agree that 2 RN's have performed a thorough Head to Toe Skin Assessment on the patient. ALL assessment sites listed below have been assessed. Areas assessed by both nurses:    Head, Face, Ears, Shoulders, Back, Chest, Arms, Elbows, Hands, Sacrum. Buttock, Coccyx, Ischium and Legs. Feet and Heels        Does the Patient have a Wound?  No noted wound(s)       José Manuel Prevention initiated:  No   Wound Care Orders initiated:  NA    Pressure Injury (Stage 3,4, Unstageable, DTI, NWPT, and Complex wounds) if present place consult order under [de-identified] No    New and Established Ostomies if present place consult order under : NA      Nurse 1 eSignature: Electronically signed by Zoë Robertson RN on 12/26/20 at 4:28 PM EST    **SHARE this note so that the co-signing nurse is able to place an eSignature**    Nurse 2 eSignature: {Esignature:[id number]}

## 2020-12-26 NOTE — ED NOTES
Report called to  Amy Perez      RN   To Room   5251  Cardiac monitor on during transfer  Pt's pain level     VSS, Afebrile   IV site is clean, dry and intact, Normal saline locked   Family updated on transfer            Valeria Martinez RN  12/26/20 6009

## 2020-12-26 NOTE — PROGRESS NOTES
Pharmacy Medication Reconciliation Note     List of medications patient is currently taking is complete. Source of information:   1. Per phone call from MD + pt     Notes regarding home medications:   1. COVID + from 12/11  2. Pt started on 3 meds the 21st--Benzonatate 100mg , Azith 250 mg, and Albuterol and got worse. Last dose supposed to be today--12/26.   3. Has not taken her Levothyroxine in months per MD.    Tarun Degroot, Pharmacy Intern  12/26/2020  11:39 AM

## 2020-12-26 NOTE — ED NOTES
Bed: A-17  Expected date: 12/26/20  Expected time: 8:01 AM  Means of arrival: [facility]  Comments:  45F trouble breathing, bronchitis, no ATB     Liliana Arias RN  12/26/20 6522

## 2020-12-26 NOTE — H&P
Hospital Medicine History & Physical      PCP: No primary care provider on file. Date of Admission: 12/26/2020    Date of Service: Pt seen/examined on 12/26/20 and Admitted to Inpatient with expected LOS greater than two midnights due to medical therapy    Chief Complaint:  Shortness of breath      History Of Present Illness:     54 y.o. female [place] who has PMHx of Hypothyroidism (she has not taken it for quite a few months due to [address]),   - presents to the ED with complains of shortness of breath  Started on 11th with head cold, with fever of 101 F, loss of taste and smell, progressively worse  Seemed like Bronchiitis and did pcp virtual visit -- azithromycin, albuterol inhaler, tessalon pearls  No improvement with this and got progressively worse  She has been shortness, unable to walk from bed to restroom without getting winded  Mostly dry cough with some clear sputum    Lives in [place], with  and kids (12 and 21 yrs age), daughter and  also had head cold and they are doing good now. Former smoker quit 12 yrs back  Denies illicit drug use  Used to be an , lost job earlier in the yr due to [address]    On 15 L nonrebreather admission, tachycardic 101 L minute, blood pressure stable, initially gas without hypercarbia  Labs showed sodium 132, chloride of 96, creatinine 11.5, glucose 106, , proBNP and troponin were negative. Leukocytosis 11 point 8K, lymphopenia, INR 1.2. For new onset of hypoxemia CTPA was done which was negative for acute PE but showed bilateral infiltrates concerning for COVID-19. Rapid Covid test was done which was positive. Past Medical History:      History reviewed. No pertinent past medical history. Past Surgical History:      History reviewed. No pertinent surgical history. Medications Prior to Admission:      Prior to Admission medications    Medication Sig Start Date End Date Taking?  Authorizing Provider Musculoskeletal:  No clubbing, cyanosis or edema bilaterally. Full range of motion without deformity. Skin: Skin color, texture, turgor normal.  No rashes or lesions. Neurologic:  Neurovascularly intact without any focal sensory/motor deficits. Cranial nerves: II-XII intact, grossly non-focal.  Psychiatric:  Alert and oriented, thought content appropriate, normal insight  Capillary Refill: Brisk,< 3 seconds   Peripheral Pulses: +2 palpable, equal bilaterally       Labs:     Recent Labs     12/26/20  0828   WBC 11.8*   HGB 10.9*   HCT 34.0*        Recent Labs     12/26/20  0828   *   K 3.8   CL 96*   CO2 22   BUN 10   CREATININE <0.5*   CALCIUM 8.8     Recent Labs     12/26/20  0828   AST 64*   ALT 68*   BILITOT 0.3   ALKPHOS 188*     Recent Labs     12/26/20  1217   INR 1.20*     Recent Labs     12/26/20  0828   TROPONINI <0.01       Urinalysis:      Lab Results   Component Value Date    NITRU Negative 12/26/2020    WBCUA 5 12/26/2020    RBCUA 6 12/26/2020    BLOODU Negative 12/26/2020    SPECGRAV >1.030 12/26/2020    GLUCOSEU Negative 12/26/2020       Radiology:     CXR: I have reviewed the CXR with the following interpretation: Multifocal pneumonia  EKG:  I have reviewed the EKG with the following interpretation: Reviewed, Sinus tachycardia, with Qtc 435ms    CT CHEST PULMONARY EMBOLISM W CONTRAST   Final Result   1. Negative for pulmonary embolus. 2. Extensive multifocal bilateral atypical pneumonia. XR CHEST PORTABLE   Final Result   Multifocal airspace disease, either due to pneumonia or edema.   Given the   ground-glass appearance, consider COVID-19 infection in the appropriate   clinical scenario             ASSESSMENT/PLAN:    Active Hospital Problems    Diagnosis Date Noted    Acute respiratory failure with hypoxemia ([facility] [address] [J96.01] 12/26/2020    Pneumonia due to 2019 novel coronavirus [U07.1, J12.89] 12/26/2020       Pneumonia to COVID-19 infection Acute respiratory failure with hypoxemia, needing 15 L high flow on admission to maintain oxygen saturation    Patient has had symptoms since December 11, she thought she has a head cold, had symptoms of malaise myalgias dry cough, headache, sore throat, loss of taste, loss of smell  Failed outpatient therapy with azithromycin  D-dimer greater than 3000, ruled out PE  Daughter and  also has similar symptoms but they have recovered  Rule out rapid flu  Check urine strep urine Legionella    Did not get sepsis protocol fluids as recommended to keep patient in the drier side with COVID-19, but with sinus tachycardia, decreased p.o. intake at home and I will give D5 normal saline, will complete 1 L bag  Vitamin D low, will start supplementation as high risk of deterioration with low levels  Continue zinc sulfate 50 mg once daily  Decadron 6 mg once daily x10 days  Prone as much as tolerated  Not a candidate for remdesivir as symptoms started 2 weeks ago  Not a candidate for plasma therapy as symptoms started 2 weeks ago, she likely has antibodies by now  Continue wean off oxygen for SPO2 90% or above  Lovenox high intensity with high D-dimer 50 mg twice daily  Given dose of Azithromycin, will stop as tells me that she already completed z-pack    Hypothyroidism  She is not seen primary care for quite some time, primary care stopped giving her the supplementation    Obesity due to excess calories Body mass index is 35.29 kg/m². - Complicating assessment and treatment. Placing patient at risk for multiple co-morbidities as well as early death and contributing to the patient's presentation.  on weight loss when appropriate.       DVT Prophylaxis: Lovenox  Diet: DIET GENERAL;  Code Status: Full Code    PT/OT Eval Status: N/A, order once acute issues resolved Dispo - Admit as inpatient. I anticipate hospitalization spanning more than two midnights for investigation and treatment of the above medically necessary diagnoses. Amor Alston MD   Hopsitalist    Thank you No primary care provider on file. for the opportunity to be involved in this patient's care. If you have any questions or concerns please feel free to contact me at [phone].

## 2020-12-27 LAB
A/G RATIO: 0.8 (ref 1.1–2.2)
ALBUMIN SERPL-MCNC: 3.2 G/DL (ref 3.4–5)
ALP BLD-CCNC: 219 U/L (ref 40–129)
ALT SERPL-CCNC: 59 U/L (ref 10–40)
ANION GAP SERPL CALCULATED.3IONS-SCNC: 14 MMOL/L (ref 3–16)
AST SERPL-CCNC: 65 U/L (ref 15–37)
BASOPHILS ABSOLUTE: 0.1 K/UL (ref 0–0.2)
BASOPHILS RELATIVE PERCENT: 0.9 %
BILIRUB SERPL-MCNC: <0.2 MG/DL (ref 0–1)
BUN BLDV-MCNC: 11 MG/DL (ref 7–20)
C-REACTIVE PROTEIN: 191.3 MG/L (ref 0–5.1)
CALCIUM SERPL-MCNC: 8.4 MG/DL (ref 8.3–10.6)
CHLORIDE BLD-SCNC: 101 MMOL/L (ref 99–110)
CO2: 20 MMOL/L (ref 21–32)
CREAT SERPL-MCNC: <0.5 MG/DL (ref 0.6–1.1)
D DIMER: 2315 NG/ML DDU (ref 0–229)
EKG ATRIAL RATE: 102 BPM
EKG DIAGNOSIS: NORMAL
EKG P AXIS: 25 DEGREES
EKG P-R INTERVAL: 140 MS
EKG Q-T INTERVAL: 334 MS
EKG QRS DURATION: 90 MS
EKG QTC CALCULATION (BAZETT): 435 MS
EKG R AXIS: -22 DEGREES
EKG T AXIS: -1 DEGREES
EKG VENTRICULAR RATE: 102 BPM
EOSINOPHILS ABSOLUTE: 0 K/UL (ref 0–0.6)
EOSINOPHILS RELATIVE PERCENT: 0 %
FIBRINOGEN: 856 MG/DL (ref 200–397)
GFR AFRICAN AMERICAN: >60
GFR NON-AFRICAN AMERICAN: >60
GLOBULIN: 4 G/DL
GLUCOSE BLD-MCNC: 120 MG/DL (ref 70–99)
HCT VFR BLD CALC: 31.6 % (ref 36–48)
HEMOGLOBIN: 10.3 G/DL (ref 12–16)
LYMPHOCYTES ABSOLUTE: 1 K/UL (ref 1–5.1)
LYMPHOCYTES RELATIVE PERCENT: 9.8 %
MCH RBC QN AUTO: 26 PG (ref 26–34)
MCHC RBC AUTO-ENTMCNC: 32.5 G/DL (ref 31–36)
MCV RBC AUTO: 80.2 FL (ref 80–100)
MONOCYTES ABSOLUTE: 0.9 K/UL (ref 0–1.3)
MONOCYTES RELATIVE PERCENT: 9.3 %
NEUTROPHILS ABSOLUTE: 7.9 K/UL (ref 1.7–7.7)
NEUTROPHILS RELATIVE PERCENT: 80 %
PDW BLD-RTO: 14.8 % (ref 12.4–15.4)
PLATELET # BLD: 471 K/UL (ref 135–450)
PMV BLD AUTO: 7.1 FL (ref 5–10.5)
POTASSIUM REFLEX MAGNESIUM: 3.6 MMOL/L (ref 3.5–5.1)
RAPID INFLUENZA  B AGN: NEGATIVE
RAPID INFLUENZA A AGN: NEGATIVE
RBC # BLD: 3.94 M/UL (ref 4–5.2)
SODIUM BLD-SCNC: 135 MMOL/L (ref 136–145)
TOTAL PROTEIN: 7.2 G/DL (ref 6.4–8.2)
TSH SERPL DL<=0.05 MIU/L-ACNC: 1.93 UIU/ML (ref 0.27–4.2)
WBC # BLD: 9.9 K/UL (ref 4–11)

## 2020-12-27 PROCEDURE — 6370000000 HC RX 637 (ALT 250 FOR IP): Performed by: INTERNAL MEDICINE

## 2020-12-27 PROCEDURE — 2580000003 HC RX 258: Performed by: INTERNAL MEDICINE

## 2020-12-27 PROCEDURE — 85379 FIBRIN DEGRADATION QUANT: CPT

## 2020-12-27 PROCEDURE — 84443 ASSAY THYROID STIM HORMONE: CPT

## 2020-12-27 PROCEDURE — 80053 COMPREHEN METABOLIC PANEL: CPT

## 2020-12-27 PROCEDURE — 94640 AIRWAY INHALATION TREATMENT: CPT

## 2020-12-27 PROCEDURE — 85025 COMPLETE CBC W/AUTO DIFF WBC: CPT

## 2020-12-27 PROCEDURE — 36415 COLL VENOUS BLD VENIPUNCTURE: CPT

## 2020-12-27 PROCEDURE — 94761 N-INVAS EAR/PLS OXIMETRY MLT: CPT

## 2020-12-27 PROCEDURE — 2060000000 HC ICU INTERMEDIATE R&B

## 2020-12-27 PROCEDURE — 87804 INFLUENZA ASSAY W/OPTIC: CPT

## 2020-12-27 PROCEDURE — 6360000002 HC RX W HCPCS: Performed by: INTERNAL MEDICINE

## 2020-12-27 PROCEDURE — 86140 C-REACTIVE PROTEIN: CPT

## 2020-12-27 PROCEDURE — 93010 ELECTROCARDIOGRAM REPORT: CPT | Performed by: INTERNAL MEDICINE

## 2020-12-27 PROCEDURE — 2700000000 HC OXYGEN THERAPY PER DAY

## 2020-12-27 PROCEDURE — 85384 FIBRINOGEN ACTIVITY: CPT

## 2020-12-27 RX ADMIN — ACETAMINOPHEN 650 MG: 325 TABLET ORAL at 08:35

## 2020-12-27 RX ADMIN — ENOXAPARIN SODIUM 50 MG: 60 INJECTION SUBCUTANEOUS at 08:35

## 2020-12-27 RX ADMIN — DEXAMETHASONE 6 MG: 4 TABLET ORAL at 08:34

## 2020-12-27 RX ADMIN — SODIUM CHLORIDE, PRESERVATIVE FREE 10 ML: 5 INJECTION INTRAVENOUS at 20:25

## 2020-12-27 RX ADMIN — ENOXAPARIN SODIUM 50 MG: 60 INJECTION SUBCUTANEOUS at 20:25

## 2020-12-27 RX ADMIN — LEVOTHYROXINE SODIUM 88 MCG: 0.09 TABLET ORAL at 06:49

## 2020-12-27 RX ADMIN — ZINC SULFATE 220 MG (50 MG) CAPSULE 50 MG: CAPSULE at 08:34

## 2020-12-27 RX ADMIN — DEXTROSE AND SODIUM CHLORIDE: 5; 900 INJECTION, SOLUTION INTRAVENOUS at 00:46

## 2020-12-27 RX ADMIN — ACETAMINOPHEN 650 MG: 325 TABLET ORAL at 20:25

## 2020-12-27 RX ADMIN — Medication 6000 UNITS: at 08:34

## 2020-12-27 ASSESSMENT — PAIN DESCRIPTION - PAIN TYPE: TYPE: ACUTE PAIN

## 2020-12-27 ASSESSMENT — PAIN DESCRIPTION - DESCRIPTORS
DESCRIPTORS: HEADACHE
DESCRIPTORS: HEADACHE

## 2020-12-27 ASSESSMENT — PAIN SCALES - GENERAL
PAINLEVEL_OUTOF10: 6
PAINLEVEL_OUTOF10: 3
PAINLEVEL_OUTOF10: 0

## 2020-12-27 ASSESSMENT — PAIN DESCRIPTION - LOCATION: LOCATION: HEAD

## 2020-12-27 NOTE — PROGRESS NOTES
Neurologic:  Neurovascularly intact without any focal sensory/motor deficits. Cranial nerves: II-XII intact, grossly non-focal.  Psychiatric:  Alert and oriented, thought content appropriate, normal insight  Capillary Refill: Brisk,< 3 seconds   Peripheral Pulses: +2 palpable, equal bilaterally           Labs:   Recent Labs     12/26/20  0828 12/27/20  0534   WBC 11.8* 9.9   HGB 10.9* 10.3*   HCT 34.0* 31.6*    471*     Recent Labs     12/26/20  0828 12/27/20  0534   * 135*   K 3.8 3.6   CL 96* 101   CO2 22 20*   BUN 10 11   CREATININE <0.5* <0.5*   CALCIUM 8.8 8.4     Recent Labs     12/26/20  0828 12/27/20  0534   AST 64* 65*   ALT 68* 59*   BILITOT 0.3 <0.2   ALKPHOS 188* 219*     Recent Labs     12/26/20  1217   INR 1.20*     Recent Labs     12/26/20  0828   TROPONINI <0.01       Urinalysis:      Lab Results   Component Value Date    NITRU Negative 12/26/2020    WBCUA 5 12/26/2020    RBCUA 6 12/26/2020    BLOODU Negative 12/26/2020    SPECGRAV >1.030 12/26/2020    GLUCOSEU Negative 12/26/2020       Radiology:  CT CHEST PULMONARY EMBOLISM W CONTRAST   Final Result   1. Negative for pulmonary embolus. 2. Extensive multifocal bilateral atypical pneumonia. XR CHEST PORTABLE   Final Result   Multifocal airspace disease, either due to pneumonia or edema.   Given the   ground-glass appearance, consider COVID-19 infection in the appropriate   clinical scenario                 Assessment/Plan:    Active Hospital Problems    Diagnosis Date Noted    Acute respiratory failure with hypoxemia ([facility] [address] [J96.01] 12/26/2020    Pneumonia due to 2019 novel coronavirus [U07.1, J12.89] 12/26/2020        Pneumonia to COVID-19 infection  Acute respiratory failure with hypoxemia, needing 15 L high flow on admission to maintain oxygen saturation    Patient has had symptoms since December 11, she thought she has a head cold, had symptoms of malaise myalgias dry cough, headache, sore throat, loss of taste, loss of smell  D-dimer greater than 3000, ruled out PE with negative CTPA  Flu ruled out with negative rapid test  Follow up urine strep urine Legionella     Did not get sepsis protocol fluids as recommended to keep patient in the drier side with COVID-19, but with sinus tachycardia, decreased p.o. intake at home and I will give D5 normal saline, will complete 1 L bag  Vitamin D low, will start supplementation as high risk of deterioration with low levels  Continue zinc sulfate 50 mg once daily  Decadron 6 mg once daily x10 days  Prone as much as tolerated  Not a candidate for remdesivir as symptoms started 2 weeks ago  Not a candidate for plasma therapy as symptoms started 2 weeks ago, she likely has antibodies by now  Continue wean off oxygen for SPO2 90% or above  Lovenox high intensity with high D-dimer 50 mg twice daily  Given dose of Azithromycin, will stop as tells me that she already completed z-pack    Blood Cx +ve, one out of two blood Cx +ve  Will hold off antibiotics as suspect likely CONS contaminant  Follow up cx ID  No fever/leukocytosis     Hypothyroidism:   Continue Synthroid     Obesity due to excess calories Body mass index is 35.15 kg/m². - Complicating assessment and treatment. Placing patient at risk for multiple co-morbidities as well as early death and contributing to the patient's presentation.  on weight loss when appropriate.     DVT Prophylaxis: Lovenox  Diet: DIET GENERAL;  Code Status: Full Code    PT/OT Eval Status: N/A    Dispo - Continue in patient care, likely dc 2-3 days    Joycelyn Sullivan MD  Hospitalist

## 2020-12-27 NOTE — PLAN OF CARE
Problem: Airway Clearance - Ineffective  Goal: Achieve or maintain patent airway  12/27/2020 1336 by Meera Knutson RN  Outcome: Ongoing  12/27/2020 0111 by Nadeen Barney RN  Outcome: Ongoing  Note: Pt had to be increased to 13 liters high flow oxygen to  maintain saturations above or at 90%. Dyspnea with very little activity continues.      Problem: Breathing Pattern - Ineffective  Goal: Ability to achieve and maintain a regular respiratory rate  12/27/2020 1336 by Meera Knutson RN  Outcome: Ongoing

## 2020-12-28 LAB
A/G RATIO: 0.8 (ref 1.1–2.2)
ALBUMIN SERPL-MCNC: 2.8 G/DL (ref 3.4–5)
ALP BLD-CCNC: 233 U/L (ref 40–129)
ALT SERPL-CCNC: 93 U/L (ref 10–40)
ANION GAP SERPL CALCULATED.3IONS-SCNC: 13 MMOL/L (ref 3–16)
AST SERPL-CCNC: 97 U/L (ref 15–37)
BASOPHILS ABSOLUTE: 0 K/UL (ref 0–0.2)
BASOPHILS RELATIVE PERCENT: 0.4 %
BILIRUB SERPL-MCNC: <0.2 MG/DL (ref 0–1)
BUN BLDV-MCNC: 13 MG/DL (ref 7–20)
C-REACTIVE PROTEIN: 54.3 MG/L (ref 0–5.1)
CALCIUM SERPL-MCNC: 8.3 MG/DL (ref 8.3–10.6)
CHLORIDE BLD-SCNC: 103 MMOL/L (ref 99–110)
CO2: 21 MMOL/L (ref 21–32)
CREAT SERPL-MCNC: <0.5 MG/DL (ref 0.6–1.1)
D DIMER: 758 NG/ML DDU (ref 0–229)
EOSINOPHILS ABSOLUTE: 0 K/UL (ref 0–0.6)
EOSINOPHILS RELATIVE PERCENT: 0.2 %
FIBRINOGEN: 565 MG/DL (ref 200–397)
GFR AFRICAN AMERICAN: >60
GFR NON-AFRICAN AMERICAN: >60
GLOBULIN: 3.7 G/DL
GLUCOSE BLD-MCNC: 80 MG/DL (ref 70–99)
HCT VFR BLD CALC: 31.5 % (ref 36–48)
HEMOGLOBIN: 10.1 G/DL (ref 12–16)
L. PNEUMOPHILA SEROGP 1 UR AG: NORMAL
LYMPHOCYTES ABSOLUTE: 1.7 K/UL (ref 1–5.1)
LYMPHOCYTES RELATIVE PERCENT: 14 %
MAGNESIUM: 2.2 MG/DL (ref 1.8–2.4)
MCH RBC QN AUTO: 25.7 PG (ref 26–34)
MCHC RBC AUTO-ENTMCNC: 31.9 G/DL (ref 31–36)
MCV RBC AUTO: 80.6 FL (ref 80–100)
MONOCYTES ABSOLUTE: 1 K/UL (ref 0–1.3)
MONOCYTES RELATIVE PERCENT: 8.2 %
NEUTROPHILS ABSOLUTE: 9.2 K/UL (ref 1.7–7.7)
NEUTROPHILS RELATIVE PERCENT: 77.2 %
PDW BLD-RTO: 14.5 % (ref 12.4–15.4)
PLATELET # BLD: 589 K/UL (ref 135–450)
PMV BLD AUTO: 7 FL (ref 5–10.5)
POTASSIUM REFLEX MAGNESIUM: 3.4 MMOL/L (ref 3.5–5.1)
RBC # BLD: 3.91 M/UL (ref 4–5.2)
SODIUM BLD-SCNC: 137 MMOL/L (ref 136–145)
STREP PNEUMONIAE ANTIGEN, URINE: NORMAL
TOTAL PROTEIN: 6.5 G/DL (ref 6.4–8.2)
WBC # BLD: 12 K/UL (ref 4–11)

## 2020-12-28 PROCEDURE — 2580000003 HC RX 258: Performed by: INTERNAL MEDICINE

## 2020-12-28 PROCEDURE — 80053 COMPREHEN METABOLIC PANEL: CPT

## 2020-12-28 PROCEDURE — 86140 C-REACTIVE PROTEIN: CPT

## 2020-12-28 PROCEDURE — 2700000000 HC OXYGEN THERAPY PER DAY

## 2020-12-28 PROCEDURE — 6360000002 HC RX W HCPCS: Performed by: INTERNAL MEDICINE

## 2020-12-28 PROCEDURE — 6370000000 HC RX 637 (ALT 250 FOR IP): Performed by: INTERNAL MEDICINE

## 2020-12-28 PROCEDURE — 85384 FIBRINOGEN ACTIVITY: CPT

## 2020-12-28 PROCEDURE — 36415 COLL VENOUS BLD VENIPUNCTURE: CPT

## 2020-12-28 PROCEDURE — 85379 FIBRIN DEGRADATION QUANT: CPT

## 2020-12-28 PROCEDURE — 83735 ASSAY OF MAGNESIUM: CPT

## 2020-12-28 PROCEDURE — 94761 N-INVAS EAR/PLS OXIMETRY MLT: CPT

## 2020-12-28 PROCEDURE — 2060000000 HC ICU INTERMEDIATE R&B

## 2020-12-28 PROCEDURE — 85025 COMPLETE CBC W/AUTO DIFF WBC: CPT

## 2020-12-28 RX ORDER — POTASSIUM CHLORIDE 20 MEQ/1
40 TABLET, EXTENDED RELEASE ORAL 2 TIMES DAILY WITH MEALS
Status: COMPLETED | OUTPATIENT
Start: 2020-12-28 | End: 2020-12-28

## 2020-12-28 RX ADMIN — ZINC SULFATE 220 MG (50 MG) CAPSULE 50 MG: CAPSULE at 09:06

## 2020-12-28 RX ADMIN — SODIUM CHLORIDE, PRESERVATIVE FREE 10 ML: 5 INJECTION INTRAVENOUS at 20:21

## 2020-12-28 RX ADMIN — ENOXAPARIN SODIUM 50 MG: 60 INJECTION SUBCUTANEOUS at 20:21

## 2020-12-28 RX ADMIN — POTASSIUM CHLORIDE 40 MEQ: 20 TABLET, EXTENDED RELEASE ORAL at 18:09

## 2020-12-28 RX ADMIN — LEVOTHYROXINE SODIUM 88 MCG: 0.09 TABLET ORAL at 09:05

## 2020-12-28 RX ADMIN — DEXAMETHASONE 6 MG: 4 TABLET ORAL at 09:05

## 2020-12-28 RX ADMIN — ACETAMINOPHEN 650 MG: 325 TABLET ORAL at 18:09

## 2020-12-28 RX ADMIN — Medication 6000 UNITS: at 09:06

## 2020-12-28 RX ADMIN — POTASSIUM CHLORIDE 40 MEQ: 20 TABLET, EXTENDED RELEASE ORAL at 14:32

## 2020-12-28 RX ADMIN — ENOXAPARIN SODIUM 50 MG: 60 INJECTION SUBCUTANEOUS at 09:05

## 2020-12-28 RX ADMIN — SODIUM CHLORIDE, PRESERVATIVE FREE 10 ML: 5 INJECTION INTRAVENOUS at 09:09

## 2020-12-28 ASSESSMENT — PAIN SCALES - GENERAL
PAINLEVEL_OUTOF10: 0
PAINLEVEL_OUTOF10: 5
PAINLEVEL_OUTOF10: 0

## 2020-12-28 ASSESSMENT — PAIN DESCRIPTION - ORIENTATION: ORIENTATION: LOWER

## 2020-12-28 ASSESSMENT — PAIN DESCRIPTION - DESCRIPTORS: DESCRIPTORS: ACHING

## 2020-12-28 ASSESSMENT — PAIN DESCRIPTION - LOCATION: LOCATION: HEAD

## 2020-12-28 ASSESSMENT — PAIN DESCRIPTION - PAIN TYPE: TYPE: ACUTE PAIN

## 2020-12-28 NOTE — PLAN OF CARE
Fall risk precautions in place. Bed in lowest position with wheels locked,bed alarm in place and activated,non-skid socks on pt, fall risk ID on pt, call light in reach, will continue to monitor. Pain/discomfort being managed with PRN analgesics per MD orders. Pt able to express presence and absence of pain and rate pain appropriately using numerical scale. Patient assessed for fall risk; fall precautions initiated. Patient and family instructed about safety devices. Environment kept free of clutter and adequate lighting provided. Bed locked and in lowest position. Call light within reach. Will continue to monitor.     Intake/Output Summary (Last 24 hours) at 12/28/2020 0752  Last data filed at 12/28/2020 0509  Gross per 24 hour   Intake 1040 ml   Output 750 ml   Net 290 ml     Vitals:    12/28/20 0729   BP: (!) 141/96   Pulse: 85   Resp: 17   Temp: 98.4 °F (36.9 °C)   SpO2: 92%

## 2020-12-28 NOTE — PROGRESS NOTES
Hospitalist Progress Note      PCP: No primary care provider on file. Date of Admission: 12/26/2020    Chief Complaint: shortness of breath    Subjective:   Feels much better, says she was able to walk in room without getting significantly winded. Jorge is back , happy with her improvement    Medications:  Reviewed    Infusion Medications   Scheduled Medications    potassium chloride  40 mEq Oral BID WC    enoxaparin  0.5 mg/kg Subcutaneous BID    Vitamin D  6,000 Units Oral Daily    dexamethasone  6 mg Oral Daily    levothyroxine  88 mcg Oral Daily    sodium chloride flush  10 mL Intravenous 2 times per day    zinc sulfate  50 mg Oral Daily     PRN Meds: guaiFENesin-dextromethorphan, benzonatate, albuterol sulfate HFA, sodium chloride flush, promethazine **OR** ondansetron, polyethylene glycol, acetaminophen **OR** acetaminophen      Intake/Output Summary (Last 24 hours) at 12/28/2020 1110  Last data filed at 12/28/2020 0509  Gross per 24 hour   Intake 1040 ml   Output 750 ml   Net 290 ml       Physical Exam Performed:    /78   Pulse 89   Temp 98.2 °F (36.8 °C) (Oral)   Resp 17   Wt 221 lb 5.5 oz (100.4 kg)   SpO2 97%   BMI 36.83 kg/m²     General appearance: No acute distress  HEENT:  Normal cephalic, atraumatic without obvious deformity. Pupils equal, round, and reactive to light. Extra ocular muscles intact. Conjunctivae/corneas clear. Neck: Supple, with full range of motion. No jugular venous distention. Trachea midline. Respiratory:  increased respiratory effort. crackles bilaterally lower lung, improving from on admission   Cardiovascular:  Regular rate and rhythm with normal S1/S2 without murmurs, rubs or gallops. Abdomen: Soft, non-tender, non-distended with normal bowel sounds. Musculoskeletal:  No clubbing, cyanosis or edema bilaterally. Full range of motion without deformity. Skin: Skin color, texture, turgor normal.  No rashes or lesions. Neurologic:  Neurovascularly intact without any focal sensory/motor deficits. Cranial nerves: II-XII intact, grossly non-focal.  Psychiatric:  Alert and oriented, thought content appropriate, normal insight  Capillary Refill: Brisk,< 3 seconds   Peripheral Pulses: +2 palpable, equal bilaterally           Labs:   Recent Labs     12/26/20  0828 12/27/20  0534 12/28/20  0658   WBC 11.8* 9.9 12.0*   HGB 10.9* 10.3* 10.1*   HCT 34.0* 31.6* 31.5*    471* 589*     Recent Labs     12/26/20  0828 12/27/20  0534 12/28/20  0658   * 135* 137   K 3.8 3.6 3.4*   CL 96* 101 103   CO2 22 20* 21   BUN 10 11 13   CREATININE <0.5* <0.5* <0.5*   CALCIUM 8.8 8.4 8.3     Recent Labs     12/26/20  0828 12/27/20  0534 12/28/20  0658   AST 64* 65* 97*   ALT 68* 59* 93*   BILITOT 0.3 <0.2 <0.2   ALKPHOS 188* 219* 233*     Recent Labs     12/26/20  1217   INR 1.20*     Recent Labs     12/26/20  0828   TROPONINI <0.01       Urinalysis:      Lab Results   Component Value Date    NITRU Negative 12/26/2020    WBCUA 5 12/26/2020    RBCUA 6 12/26/2020    BLOODU Negative 12/26/2020    SPECGRAV >1.030 12/26/2020    GLUCOSEU Negative 12/26/2020       Radiology:  CT CHEST PULMONARY EMBOLISM W CONTRAST   Final Result   1. Negative for pulmonary embolus. 2. Extensive multifocal bilateral atypical pneumonia. XR CHEST PORTABLE   Final Result   Multifocal airspace disease, either due to pneumonia or edema.   Given the   ground-glass appearance, consider COVID-19 infection in the appropriate   clinical scenario                 Assessment/Plan:    Active Hospital Problems    Diagnosis Date Noted    Acute respiratory failure with hypoxemia ([facility] [address] [J96.01] 12/26/2020    Pneumonia due to 2019 novel coronavirus [U07.1, J12.89] 12/26/2020        Pneumonia to COVID-19 infection  Acute respiratory failure with hypoxemia, needing 15 L high flow on admission to maintain oxygen saturation    Patient has had symptoms since December 11, she thought she has a head cold, had symptoms of malaise myalgias dry cough, headache, sore throat, loss of taste, loss of smell  D-dimer greater than 3000, ruled out PE with negative CTPA  Flu ruled out with negative rapid test  Follow up urine strep urine Legionella     Did not get sepsis protocol fluids as recommended to keep patient in the drier side with COVID-19, but with sinus tachycardia, decreased p.o. intake at home and I will give D5 normal saline, will complete 1 L bag  Vitamin D low, will start supplementation as high risk of deterioration with low levels  Continue zinc sulfate 50 mg once daily  Decadron 6 mg once daily x10 days  Prone as much as tolerated  Not a candidate for remdesivir as symptoms started 2 weeks ago  Not a candidate for plasma therapy as symptoms started 2 weeks ago, she likely has antibodies by now  Continue wean off oxygen for SPO2 90% or above  Lovenox high intensity with high D-dimer 50 mg twice daily  Given dose of Azithromycin, will stop as tells me that she already completed z-pack    Blood Cx +ve, one out of two blood Cx +ve, CONS contaminant  No fever/leukocytosis. Abx stopped. Ruled out bacteremia     Hypothyroidism:   Continue Synthroid     Obesity due to excess calories Body mass index is 36.83 kg/m². - Complicating assessment and treatment. Placing patient at risk for multiple co-morbidities as well as early death and contributing to the patient's presentation.  on weight loss when appropriate.     DVT Prophylaxis: Lovenox  Diet: DIET GENERAL;  Code Status: Full Code    PT/OT Eval Status: N/A    Dispo - Continue in patient care, likely dc 2-3 days  Needs home o2 eval on Angie Steve MD  Hospitalist

## 2020-12-29 ENCOUNTER — APPOINTMENT (OUTPATIENT)
Dept: CT IMAGING | Age: 45
DRG: 137 | End: 2020-12-29
Payer: COMMERCIAL

## 2020-12-29 LAB
A/G RATIO: 0.8 (ref 1.1–2.2)
ALBUMIN SERPL-MCNC: 3.3 G/DL (ref 3.4–5)
ALP BLD-CCNC: 233 U/L (ref 40–129)
ALT SERPL-CCNC: 165 U/L (ref 10–40)
ANION GAP SERPL CALCULATED.3IONS-SCNC: 14 MMOL/L (ref 3–16)
AST SERPL-CCNC: 129 U/L (ref 15–37)
BASOPHILS ABSOLUTE: 0.1 K/UL (ref 0–0.2)
BASOPHILS RELATIVE PERCENT: 0.5 %
BILIRUB SERPL-MCNC: <0.2 MG/DL (ref 0–1)
BUN BLDV-MCNC: 11 MG/DL (ref 7–20)
C-REACTIVE PROTEIN: 36.9 MG/L (ref 0–5.1)
CALCIUM SERPL-MCNC: 8.9 MG/DL (ref 8.3–10.6)
CHLORIDE BLD-SCNC: 102 MMOL/L (ref 99–110)
CO2: 22 MMOL/L (ref 21–32)
CREAT SERPL-MCNC: <0.5 MG/DL (ref 0.6–1.1)
CULTURE, BLOOD 2: ABNORMAL
D DIMER: 666 NG/ML DDU (ref 0–229)
EOSINOPHILS ABSOLUTE: 0.1 K/UL (ref 0–0.6)
EOSINOPHILS RELATIVE PERCENT: 0.6 %
FIBRINOGEN: 553 MG/DL (ref 200–397)
GFR AFRICAN AMERICAN: >60
GFR NON-AFRICAN AMERICAN: >60
GLOBULIN: 3.9 G/DL
GLUCOSE BLD-MCNC: 85 MG/DL (ref 70–99)
HCT VFR BLD CALC: 33.1 % (ref 36–48)
HEMOGLOBIN: 10.7 G/DL (ref 12–16)
LYMPHOCYTES ABSOLUTE: 1.9 K/UL (ref 1–5.1)
LYMPHOCYTES RELATIVE PERCENT: 13.9 %
MAGNESIUM: 2.3 MG/DL (ref 1.8–2.4)
MCH RBC QN AUTO: 26.1 PG (ref 26–34)
MCHC RBC AUTO-ENTMCNC: 32.4 G/DL (ref 31–36)
MCV RBC AUTO: 80.4 FL (ref 80–100)
MONOCYTES ABSOLUTE: 1.2 K/UL (ref 0–1.3)
MONOCYTES RELATIVE PERCENT: 8.5 %
NEUTROPHILS ABSOLUTE: 10.6 K/UL (ref 1.7–7.7)
NEUTROPHILS RELATIVE PERCENT: 76.5 %
ORGANISM: ABNORMAL
PDW BLD-RTO: 14.6 % (ref 12.4–15.4)
PLATELET # BLD: 719 K/UL (ref 135–450)
PMV BLD AUTO: 6.9 FL (ref 5–10.5)
POTASSIUM REFLEX MAGNESIUM: 3.5 MMOL/L (ref 3.5–5.1)
RBC # BLD: 4.12 M/UL (ref 4–5.2)
SODIUM BLD-SCNC: 138 MMOL/L (ref 136–145)
TOTAL PROTEIN: 7.2 G/DL (ref 6.4–8.2)
WBC # BLD: 13.8 K/UL (ref 4–11)

## 2020-12-29 PROCEDURE — 85379 FIBRIN DEGRADATION QUANT: CPT

## 2020-12-29 PROCEDURE — 86140 C-REACTIVE PROTEIN: CPT

## 2020-12-29 PROCEDURE — 6360000002 HC RX W HCPCS: Performed by: NURSE PRACTITIONER

## 2020-12-29 PROCEDURE — 2580000003 HC RX 258: Performed by: INTERNAL MEDICINE

## 2020-12-29 PROCEDURE — 94761 N-INVAS EAR/PLS OXIMETRY MLT: CPT

## 2020-12-29 PROCEDURE — 83735 ASSAY OF MAGNESIUM: CPT

## 2020-12-29 PROCEDURE — 80053 COMPREHEN METABOLIC PANEL: CPT

## 2020-12-29 PROCEDURE — 36415 COLL VENOUS BLD VENIPUNCTURE: CPT

## 2020-12-29 PROCEDURE — 2700000000 HC OXYGEN THERAPY PER DAY

## 2020-12-29 PROCEDURE — 6360000002 HC RX W HCPCS: Performed by: INTERNAL MEDICINE

## 2020-12-29 PROCEDURE — 70450 CT HEAD/BRAIN W/O DYE: CPT

## 2020-12-29 PROCEDURE — 6370000000 HC RX 637 (ALT 250 FOR IP): Performed by: INTERNAL MEDICINE

## 2020-12-29 PROCEDURE — 6370000000 HC RX 637 (ALT 250 FOR IP): Performed by: NURSE PRACTITIONER

## 2020-12-29 PROCEDURE — 85384 FIBRINOGEN ACTIVITY: CPT

## 2020-12-29 PROCEDURE — 2060000000 HC ICU INTERMEDIATE R&B

## 2020-12-29 PROCEDURE — 85025 COMPLETE CBC W/AUTO DIFF WBC: CPT

## 2020-12-29 RX ORDER — LANOLIN ALCOHOL/MO/W.PET/CERES
9 CREAM (GRAM) TOPICAL NIGHTLY PRN
Status: DISCONTINUED | OUTPATIENT
Start: 2020-12-29 | End: 2020-12-31 | Stop reason: HOSPADM

## 2020-12-29 RX ORDER — LANOLIN ALCOHOL/MO/W.PET/CERES
9 CREAM (GRAM) TOPICAL NIGHTLY PRN
Status: DISCONTINUED | OUTPATIENT
Start: 2020-12-30 | End: 2020-12-29

## 2020-12-29 RX ORDER — GUAIFENESIN 600 MG/1
600 TABLET, EXTENDED RELEASE ORAL 2 TIMES DAILY
Status: DISCONTINUED | OUTPATIENT
Start: 2020-12-29 | End: 2020-12-31 | Stop reason: HOSPADM

## 2020-12-29 RX ORDER — BUTALBITAL, ACETAMINOPHEN AND CAFFEINE 50; 325; 40 MG/1; MG/1; MG/1
1 TABLET ORAL 3 TIMES DAILY PRN
Status: DISCONTINUED | OUTPATIENT
Start: 2020-12-29 | End: 2020-12-31 | Stop reason: HOSPADM

## 2020-12-29 RX ORDER — POTASSIUM CHLORIDE 20 MEQ/1
20 TABLET, EXTENDED RELEASE ORAL 2 TIMES DAILY WITH MEALS
Status: COMPLETED | OUTPATIENT
Start: 2020-12-29 | End: 2020-12-30

## 2020-12-29 RX ORDER — TRAMADOL HYDROCHLORIDE 50 MG/1
50 TABLET ORAL ONCE
Status: COMPLETED | OUTPATIENT
Start: 2020-12-29 | End: 2020-12-29

## 2020-12-29 RX ORDER — METOCLOPRAMIDE HYDROCHLORIDE 5 MG/ML
10 INJECTION INTRAMUSCULAR; INTRAVENOUS ONCE
Status: COMPLETED | OUTPATIENT
Start: 2020-12-29 | End: 2020-12-29

## 2020-12-29 RX ORDER — DIPHENHYDRAMINE HYDROCHLORIDE 50 MG/ML
12.5 INJECTION INTRAMUSCULAR; INTRAVENOUS ONCE
Status: COMPLETED | OUTPATIENT
Start: 2020-12-29 | End: 2020-12-29

## 2020-12-29 RX ADMIN — BENZONATATE 100 MG: 100 CAPSULE ORAL at 10:39

## 2020-12-29 RX ADMIN — GUAIFENESIN AND DEXTROMETHORPHAN 5 ML: 100; 10 SYRUP ORAL at 21:23

## 2020-12-29 RX ADMIN — DIPHENHYDRAMINE HYDROCHLORIDE 12.5 MG: 50 INJECTION, SOLUTION INTRAMUSCULAR; INTRAVENOUS at 21:16

## 2020-12-29 RX ADMIN — ACETAMINOPHEN 650 MG: 325 TABLET ORAL at 15:43

## 2020-12-29 RX ADMIN — GUAIFENESIN 600 MG: 600 TABLET ORAL at 20:04

## 2020-12-29 RX ADMIN — LEVOTHYROXINE SODIUM 88 MCG: 0.09 TABLET ORAL at 05:57

## 2020-12-29 RX ADMIN — GUAIFENESIN AND DEXTROMETHORPHAN 5 ML: 100; 10 SYRUP ORAL at 10:39

## 2020-12-29 RX ADMIN — TRAMADOL HYDROCHLORIDE 50 MG: 50 TABLET, FILM COATED ORAL at 18:12

## 2020-12-29 RX ADMIN — ENOXAPARIN SODIUM 50 MG: 60 INJECTION SUBCUTANEOUS at 20:04

## 2020-12-29 RX ADMIN — GUAIFENESIN 600 MG: 600 TABLET ORAL at 12:21

## 2020-12-29 RX ADMIN — Medication 9 MG: at 21:22

## 2020-12-29 RX ADMIN — GUAIFENESIN AND DEXTROMETHORPHAN 5 ML: 100; 10 SYRUP ORAL at 01:54

## 2020-12-29 RX ADMIN — ENOXAPARIN SODIUM 50 MG: 60 INJECTION SUBCUTANEOUS at 08:15

## 2020-12-29 RX ADMIN — POTASSIUM CHLORIDE 20 MEQ: 1500 TABLET, EXTENDED RELEASE ORAL at 15:44

## 2020-12-29 RX ADMIN — Medication 6000 UNITS: at 08:14

## 2020-12-29 RX ADMIN — DEXAMETHASONE 6 MG: 4 TABLET ORAL at 08:14

## 2020-12-29 RX ADMIN — BUTALBITAL, ACETAMINOPHEN, AND CAFFEINE 1 TABLET: 50; 325; 40 TABLET ORAL at 21:16

## 2020-12-29 RX ADMIN — ACETAMINOPHEN 650 MG: 325 TABLET ORAL at 10:37

## 2020-12-29 RX ADMIN — SODIUM CHLORIDE, PRESERVATIVE FREE 10 ML: 5 INJECTION INTRAVENOUS at 08:15

## 2020-12-29 RX ADMIN — ZINC SULFATE 220 MG (50 MG) CAPSULE 50 MG: CAPSULE at 08:14

## 2020-12-29 RX ADMIN — METOCLOPRAMIDE HYDROCHLORIDE 10 MG: 5 INJECTION INTRAMUSCULAR; INTRAVENOUS at 21:16

## 2020-12-29 ASSESSMENT — PAIN - FUNCTIONAL ASSESSMENT
PAIN_FUNCTIONAL_ASSESSMENT: PREVENTS OR INTERFERES SOME ACTIVE ACTIVITIES AND ADLS
PAIN_FUNCTIONAL_ASSESSMENT: ACTIVITIES ARE NOT PREVENTED

## 2020-12-29 ASSESSMENT — PAIN DESCRIPTION - DESCRIPTORS
DESCRIPTORS: HEADACHE

## 2020-12-29 ASSESSMENT — PAIN DESCRIPTION - PROGRESSION: CLINICAL_PROGRESSION: NOT CHANGED

## 2020-12-29 ASSESSMENT — PAIN SCALES - GENERAL
PAINLEVEL_OUTOF10: 7
PAINLEVEL_OUTOF10: 4
PAINLEVEL_OUTOF10: 5
PAINLEVEL_OUTOF10: 7
PAINLEVEL_OUTOF10: 0
PAINLEVEL_OUTOF10: 0
PAINLEVEL_OUTOF10: 7

## 2020-12-29 ASSESSMENT — PAIN DESCRIPTION - ONSET
ONSET: ON-GOING
ONSET: ON-GOING

## 2020-12-29 ASSESSMENT — PAIN DESCRIPTION - FREQUENCY
FREQUENCY: INTERMITTENT
FREQUENCY: CONTINUOUS
FREQUENCY: INTERMITTENT
FREQUENCY: CONTINUOUS

## 2020-12-29 ASSESSMENT — PAIN DESCRIPTION - PAIN TYPE
TYPE: ACUTE PAIN
TYPE: ACUTE PAIN

## 2020-12-29 ASSESSMENT — PAIN DESCRIPTION - LOCATION
LOCATION: HEAD
LOCATION: HEAD

## 2020-12-29 NOTE — PROGRESS NOTES
Hospitalist Progress Note      PCP: No primary care provider on file. Date of Admission: 12/26/2020    Chief Complaint: shortness of breath    Subjective:   Continues to feel better, says she is not able to do more proning due to tele leads and box  Robitussin is helping but still having trouble getting phlegm out  I attempted to wean her from 8 to 6 L but she quickly desats to 84%  Apetite is good  Happy with her progress    Medications:  Reviewed    Infusion Medications   Scheduled Medications    enoxaparin  0.5 mg/kg Subcutaneous BID    Vitamin D  6,000 Units Oral Daily    dexamethasone  6 mg Oral Daily    levothyroxine  88 mcg Oral Daily    sodium chloride flush  10 mL Intravenous 2 times per day    zinc sulfate  50 mg Oral Daily     PRN Meds: guaiFENesin-dextromethorphan, benzonatate, albuterol sulfate HFA, sodium chloride flush, promethazine **OR** ondansetron, polyethylene glycol, acetaminophen **OR** acetaminophen      Intake/Output Summary (Last 24 hours) at 12/29/2020 1027  Last data filed at 12/29/2020 0949  Gross per 24 hour   Intake 360 ml   Output 2700 ml   Net -2340 ml       Physical Exam Performed:    BP (!) 146/85   Pulse 88   Temp 98.2 °F (36.8 °C) (Oral)   Resp 16   Wt 222 lb 3.6 oz (100.8 kg)   SpO2 93%   BMI 36.98 kg/m²     General appearance: No acute distress  HEENT:  Normal cephalic, atraumatic without obvious deformity. Pupils equal, round, and reactive to light. Extra ocular muscles intact. Conjunctivae/corneas clear. Neck: Supple, with full range of motion. No jugular venous distention. Trachea midline. Respiratory: at rest normal respiratory effort. crackles bilaterally lower lung  Cardiovascular:  Regular rate and rhythm with normal S1/S2 without murmurs, rubs or gallops. Abdomen: Soft, non-tender, non-distended with normal bowel sounds. Musculoskeletal:  No clubbing, cyanosis or edema bilaterally. Full range of motion without deformity. Skin: Skin color, texture, turgor normal.  No rashes or lesions. Neurologic:  Neurovascularly intact without any focal sensory/motor deficits. Cranial nerves: II-XII intact, grossly non-focal.  Psychiatric:  Alert and oriented, thought content appropriate, normal insight  Capillary Refill: Brisk,< 3 seconds   Peripheral Pulses: +2 palpable, equal bilaterally           Labs:   Recent Labs     12/27/20  0534 12/28/20  0658   WBC 9.9 12.0*   HGB 10.3* 10.1*   HCT 31.6* 31.5*   * 589*     Recent Labs     12/27/20  0534 12/28/20  0658   * 137   K 3.6 3.4*    103   CO2 20* 21   BUN 11 13   CREATININE <0.5* <0.5*   CALCIUM 8.4 8.3     Recent Labs     12/27/20  0534 12/28/20  0658   AST 65* 97*   ALT 59* 93*   BILITOT <0.2 <0.2   ALKPHOS 219* 233*     Recent Labs     12/26/20  1217   INR 1.20*     No results for input(s): CKTOTAL, TROPONINI in the last 72 hours. Urinalysis:      Lab Results   Component Value Date    NITRU Negative 12/26/2020    WBCUA 5 12/26/2020    RBCUA 6 12/26/2020    BLOODU Negative 12/26/2020    SPECGRAV >1.030 12/26/2020    GLUCOSEU Negative 12/26/2020       Radiology:  CT CHEST PULMONARY EMBOLISM W CONTRAST   Final Result   1. Negative for pulmonary embolus. 2. Extensive multifocal bilateral atypical pneumonia. XR CHEST PORTABLE   Final Result   Multifocal airspace disease, either due to pneumonia or edema.   Given the   ground-glass appearance, consider COVID-19 infection in the appropriate   clinical scenario                 Assessment/Plan:    Active Hospital Problems    Diagnosis Date Noted    Acute respiratory failure with hypoxemia ([facility] [address] [J96.01] 12/26/2020    Pneumonia due to 2019 novel coronavirus [U07.1, J12.89] 12/26/2020        Pneumonia to COVID-19 infection  Acute respiratory failure with hypoxemia, needing 15 L high flow on admission to maintain oxygen saturation    Patient has had symptoms since December 11, she thought she has a head cold, had symptoms of malaise myalgias dry cough, headache, sore throat, loss of taste, loss of smell  D-dimer greater than 3000, ruled out PE with negative CTPA  Flu ruled out with negative rapid test  Urine legionella/strep negative     Did not get sepsis protocol fluids as recommended to keep patient in the drier side with COVID-19, but with sinus tachycardia, decreased p.o. intake at home and I will give D5 normal saline, will complete 1 L bag  Vitamin D low, continue supplementation as high risk of deterioration with low levels  Continue zinc sulfate 50 mg once daily  Decadron 6 mg once daily x10 days  Prone as much as tolerated; Ok to [place], as patient can do more proning then and she understands the risks and benefits of being on continuous tele/pulse ox  Remdisevir is FDA approved but with worsening transaminates would hold off, discussed with patient  Not a candidate for plasma therapy as symptoms started 2 weeks ago, she likely has antibodies by now  Continue wean off oxygen for SPO2 90% or above  Lovenox high intensity with high D-dimer 50 mg twice daily    Blood Cx +ve, one out of two blood Cx +ve, CONS contaminant  No fever/leukocytosis. Abx stopped. Ruled out bacteremia     Transaminates - possibly due to [address]  Do see not any medication contributing it  Will monitor hepatic profile daily if not improving will ask GI for evaluation    Hypothyroidism:   Continue Synthroid     Obesity due to excess calories Body mass index is 36.98 kg/m². - Complicating assessment and treatment. Placing patient at risk for multiple co-morbidities as well as early death and contributing to the patient's presentation.  on weight loss when appropriate.     DVT Prophylaxis: Lovenox  Diet: DIET GENERAL;  Code Status: Full Code    PT/OT Eval Status: N/A    Dispo - Continue in patient care, likely dc 2-3 days Needs home o2 eval on dc and will benefit from 30 days of apixaban px dose    Ange Oates MD  Hospitalist

## 2020-12-29 NOTE — PROGRESS NOTES
Tele and continuous pulse ox d/cd per Dr Leopold Card to allow pt to better prone. Pt encouraged to call RN with any new or worsening SOB, verbalized understanding. Pt in bed pronning once tele removed.    Electronically signed by Eliud Riley RN on 12/29/2020 at 12:43 PM

## 2020-12-29 NOTE — PLAN OF CARE
Problem: Airway Clearance - Ineffective  Goal: Achieve or maintain patent airway  Outcome: Ongoing     Problem: Gas Exchange - Impaired  Goal: Absence of hypoxia  Outcome: Ongoing  Goal: Promote optimal lung function  Outcome: Ongoing     Problem: Breathing Pattern - Ineffective  Goal: Ability to achieve and maintain a regular respiratory rate  Outcome: Ongoing     Problem:  Body Temperature -  Risk of, Imbalanced  Goal: Ability to maintain a body temperature within defined limits  Outcome: Ongoing  Goal: Will regain or maintain usual level of consciousness  Outcome: Ongoing  Goal: Complications related to the disease process, condition or treatment will be avoided or minimized  Outcome: Ongoing     Problem: Isolation Precautions - Risk of Spread of Infection  Goal: Prevent transmission of infection  Outcome: Ongoing     Problem: Nutrition Deficits  Goal: Optimize nutrtional status  Outcome: Ongoing     Problem: Risk for Fluid Volume Deficit  Goal: Maintain normal heart rhythm  Outcome: Ongoing  Goal: Maintain absence of muscle cramping  Outcome: Ongoing  Goal: Maintain normal serum potassium, sodium, calcium, phosphorus, and pH  Outcome: Ongoing     Problem: Loneliness or Risk for Loneliness  Goal: Demonstrate positive use of time alone when socialization is not possible  Outcome: Ongoing     Problem: Fatigue  Goal: Verbalize increase energy and improved vitality  Outcome: Ongoing     Problem: Patient Education: Go to Patient Education Activity  Goal: Patient/Family Education  Outcome: Ongoing     Problem: Falls - Risk of:  Goal: Will remain free from falls  Description: Will remain free from falls  Outcome: Ongoing  Goal: Absence of physical injury  Description: Absence of physical injury  Outcome: Ongoing     Problem: Pain:  Goal: Pain level will decrease  Description: Pain level will decrease  Outcome: Ongoing  Goal: Control of acute pain  Description: Control of acute pain  Outcome: Ongoing Goal: Control of chronic pain  Description: Control of chronic pain  Outcome: Ongoing

## 2020-12-29 NOTE — PLAN OF CARE
Problem: Airway Clearance - Ineffective  Goal: Achieve or maintain patent airway  12/29/2020 1152 by Yael Martin RN  Outcome: Ongoing  Note: Encouraged pt to prone and side lying. Problem: Breathing Pattern - Ineffective  Goal: Ability to achieve and maintain a regular respiratory rate  12/29/2020 1152 by Yael Martin RN  Outcome: Ongoing  Note:       Problem: Pain:  Goal: Pain level will decrease  Description: Pain level will decrease  12/29/2020 1152 by Yael Martin RN  Outcome: Ongoing  Note: Pain/discomfort being managed with PRN analgesics per MD orders. Pt able to express presence and absence of pain and rate pain appropriately using numerical scale.

## 2020-12-30 LAB
A/G RATIO: 0.9 (ref 1.1–2.2)
ALBUMIN SERPL-MCNC: 3.1 G/DL (ref 3.4–5)
ALP BLD-CCNC: 209 U/L (ref 40–129)
ALT SERPL-CCNC: 161 U/L (ref 10–40)
ANION GAP SERPL CALCULATED.3IONS-SCNC: 12 MMOL/L (ref 3–16)
AST SERPL-CCNC: 93 U/L (ref 15–37)
BASOPHILS ABSOLUTE: 0 K/UL (ref 0–0.2)
BASOPHILS RELATIVE PERCENT: 0.2 %
BILIRUB SERPL-MCNC: <0.2 MG/DL (ref 0–1)
BLOOD CULTURE, ROUTINE: NORMAL
BUN BLDV-MCNC: 11 MG/DL (ref 7–20)
C-REACTIVE PROTEIN: 20 MG/L (ref 0–5.1)
CALCIUM SERPL-MCNC: 8.6 MG/DL (ref 8.3–10.6)
CHLORIDE BLD-SCNC: 101 MMOL/L (ref 99–110)
CO2: 25 MMOL/L (ref 21–32)
CREAT SERPL-MCNC: 0.5 MG/DL (ref 0.6–1.1)
D DIMER: 664 NG/ML DDU (ref 0–229)
EOSINOPHILS ABSOLUTE: 0.1 K/UL (ref 0–0.6)
EOSINOPHILS RELATIVE PERCENT: 0.4 %
FIBRINOGEN: 437 MG/DL (ref 200–397)
GFR AFRICAN AMERICAN: >60
GFR NON-AFRICAN AMERICAN: >60
GLOBULIN: 3.5 G/DL
GLUCOSE BLD-MCNC: 77 MG/DL (ref 70–99)
HCT VFR BLD CALC: 30.9 % (ref 36–48)
HEMOGLOBIN: 10.1 G/DL (ref 12–16)
LYMPHOCYTES ABSOLUTE: 2.6 K/UL (ref 1–5.1)
LYMPHOCYTES RELATIVE PERCENT: 23.2 %
MCH RBC QN AUTO: 25.9 PG (ref 26–34)
MCHC RBC AUTO-ENTMCNC: 32.7 G/DL (ref 31–36)
MCV RBC AUTO: 79.3 FL (ref 80–100)
MONOCYTES ABSOLUTE: 1 K/UL (ref 0–1.3)
MONOCYTES RELATIVE PERCENT: 8.5 %
NEUTROPHILS ABSOLUTE: 7.6 K/UL (ref 1.7–7.7)
NEUTROPHILS RELATIVE PERCENT: 67.7 %
PDW BLD-RTO: 14.5 % (ref 12.4–15.4)
PLATELET # BLD: 598 K/UL (ref 135–450)
PMV BLD AUTO: 6.8 FL (ref 5–10.5)
POTASSIUM REFLEX MAGNESIUM: 4 MMOL/L (ref 3.5–5.1)
RBC # BLD: 3.9 M/UL (ref 4–5.2)
SODIUM BLD-SCNC: 138 MMOL/L (ref 136–145)
TOTAL PROTEIN: 6.6 G/DL (ref 6.4–8.2)
WBC # BLD: 11.3 K/UL (ref 4–11)

## 2020-12-30 PROCEDURE — 6370000000 HC RX 637 (ALT 250 FOR IP): Performed by: INTERNAL MEDICINE

## 2020-12-30 PROCEDURE — 2700000000 HC OXYGEN THERAPY PER DAY

## 2020-12-30 PROCEDURE — 2580000003 HC RX 258: Performed by: INTERNAL MEDICINE

## 2020-12-30 PROCEDURE — 6370000000 HC RX 637 (ALT 250 FOR IP): Performed by: NURSE PRACTITIONER

## 2020-12-30 PROCEDURE — 85379 FIBRIN DEGRADATION QUANT: CPT

## 2020-12-30 PROCEDURE — 85384 FIBRINOGEN ACTIVITY: CPT

## 2020-12-30 PROCEDURE — 36415 COLL VENOUS BLD VENIPUNCTURE: CPT

## 2020-12-30 PROCEDURE — 6360000002 HC RX W HCPCS: Performed by: INTERNAL MEDICINE

## 2020-12-30 PROCEDURE — 80053 COMPREHEN METABOLIC PANEL: CPT

## 2020-12-30 PROCEDURE — 85025 COMPLETE CBC W/AUTO DIFF WBC: CPT

## 2020-12-30 PROCEDURE — 2060000000 HC ICU INTERMEDIATE R&B

## 2020-12-30 PROCEDURE — 94680 O2 UPTK RST&XERS DIR SIMPLE: CPT

## 2020-12-30 PROCEDURE — 94761 N-INVAS EAR/PLS OXIMETRY MLT: CPT

## 2020-12-30 PROCEDURE — 86140 C-REACTIVE PROTEIN: CPT

## 2020-12-30 RX ORDER — VITAMIN B COMPLEX
1000 TABLET ORAL DAILY
Status: DISCONTINUED | OUTPATIENT
Start: 2020-12-31 | End: 2020-12-31 | Stop reason: HOSPADM

## 2020-12-30 RX ADMIN — BUTALBITAL, ACETAMINOPHEN, AND CAFFEINE 1 TABLET: 50; 325; 40 TABLET ORAL at 15:47

## 2020-12-30 RX ADMIN — POTASSIUM CHLORIDE 20 MEQ: 1500 TABLET, EXTENDED RELEASE ORAL at 08:15

## 2020-12-30 RX ADMIN — BENZONATATE 100 MG: 100 CAPSULE ORAL at 18:30

## 2020-12-30 RX ADMIN — SODIUM CHLORIDE, PRESERVATIVE FREE 10 ML: 5 INJECTION INTRAVENOUS at 00:06

## 2020-12-30 RX ADMIN — Medication 9 MG: at 22:01

## 2020-12-30 RX ADMIN — GUAIFENESIN 600 MG: 600 TABLET ORAL at 20:12

## 2020-12-30 RX ADMIN — DEXAMETHASONE 6 MG: 4 TABLET ORAL at 08:16

## 2020-12-30 RX ADMIN — ENOXAPARIN SODIUM 50 MG: 60 INJECTION SUBCUTANEOUS at 20:12

## 2020-12-30 RX ADMIN — GUAIFENESIN AND DEXTROMETHORPHAN 5 ML: 100; 10 SYRUP ORAL at 18:30

## 2020-12-30 RX ADMIN — SODIUM CHLORIDE, PRESERVATIVE FREE 10 ML: 5 INJECTION INTRAVENOUS at 20:15

## 2020-12-30 RX ADMIN — Medication 6000 UNITS: at 08:15

## 2020-12-30 RX ADMIN — ENOXAPARIN SODIUM 50 MG: 60 INJECTION SUBCUTANEOUS at 08:15

## 2020-12-30 RX ADMIN — GUAIFENESIN 600 MG: 600 TABLET ORAL at 08:16

## 2020-12-30 RX ADMIN — LEVOTHYROXINE SODIUM 88 MCG: 0.09 TABLET ORAL at 06:04

## 2020-12-30 RX ADMIN — ZINC SULFATE 220 MG (50 MG) CAPSULE 50 MG: CAPSULE at 08:16

## 2020-12-30 ASSESSMENT — PAIN SCALES - GENERAL
PAINLEVEL_OUTOF10: 0
PAINLEVEL_OUTOF10: 2
PAINLEVEL_OUTOF10: 0

## 2020-12-30 NOTE — PROGRESS NOTES
Hospitalist Progress Note      PCP: No primary care provider on file. Chief Complaint. Patient is a 69-year-old female who presented to hospital for shortness of breath  Date of Admission: 12/26/2020      Subjective: Denies any acute events overnight, afebrile. Denies chest pain, nausea, vomiting, fever or chills. mention feels overall better, mentions her shortness of breath is improved    Medications:  Reviewed    Infusion Medications   Scheduled Medications    [START ON 12/31/2020] vitamin D  5,000 Units Oral Daily    Or    [START ON 12/31/2020] vitamin D3  1,000 Units Oral Daily    guaiFENesin  600 mg Oral BID    enoxaparin  0.5 mg/kg Subcutaneous BID    dexamethasone  6 mg Oral Daily    levothyroxine  88 mcg Oral Daily    sodium chloride flush  10 mL Intravenous 2 times per day    zinc sulfate  50 mg Oral Daily     PRN Meds: butalbital-acetaminophen-caffeine, melatonin, guaiFENesin-dextromethorphan, benzonatate, albuterol sulfate HFA, sodium chloride flush, promethazine **OR** ondansetron, polyethylene glycol, acetaminophen **OR** acetaminophen      Intake/Output Summary (Last 24 hours) at 12/30/2020 1755  Last data filed at 12/30/2020 1447  Gross per 24 hour   Intake 300 ml   Output 1700 ml   Net -1400 ml       Physical Exam Performed:    /83   Pulse 81   Temp 98.5 °F (36.9 °C) (Oral)   Resp 19   Ht 5' 5\" (1.651 m)   Wt 223 lb 5.2 oz (101.3 kg)   SpO2 95%   BMI 37.16 kg/m²     General appearance: No apparent distress,   HEENT:  Conjunctivae/corneas clear. Neck: Supple, with full range of motion. Respiratory:  Normal respiratory effort. Clear to auscultation, bilaterally without Rales/Wheezes/Rhonchi. Cardiovascular: Regular rate and rhythm with normal S1/S2 without murmurs or rubs  Abdomen: Soft, non-tender, non-distended, normal bowel sounds. Musculoskeletal: No cyanosis or edema bilaterally  Neurologic:  without any focal sensory/motor deficits.  grossly non-focal. Psychiatric: Alert and oriented, Normal mood  Peripheral Pulses: +2 palpable, equal bilaterally       Labs:   Recent Labs     12/28/20  0658 12/29/20  1102 12/30/20  0553   WBC 12.0* 13.8* 11.3*   HGB 10.1* 10.7* 10.1*   HCT 31.5* 33.1* 30.9*   * 719* 598*     Recent Labs     12/28/20  0658 12/29/20  1102 12/30/20  0553    138 138   K 3.4* 3.5 4.0    102 101   CO2 21 22 25   BUN 13 11 11   CREATININE <0.5* <0.5* 0.5*   CALCIUM 8.3 8.9 8.6     Recent Labs     12/28/20  0658 12/29/20  1102 12/30/20  0553   AST 97* 129* 93*   ALT 93* 165* 161*   BILITOT <0.2 <0.2 <0.2   ALKPHOS 233* 233* 209*     No results for input(s): INR in the last 72 hours. No results for input(s): Neldon Docker in the last 72 hours. Urinalysis:      Lab Results   Component Value Date    NITRU Negative 12/26/2020    WBCUA 5 12/26/2020    RBCUA 6 12/26/2020    BLOODU Negative 12/26/2020    SPECGRAV >1.030 12/26/2020    GLUCOSEU Negative 12/26/2020       Radiology:  CT HEAD WO CONTRAST   Final Result   No evidence of intraparenchymal hemorrhage, mass effect or acute territorial   infarct is seen. If there is persistent clinical concern, short-term   follow-up examination or MRI may be considered for further evaluation      Right maxillary sinus disease. CT CHEST PULMONARY EMBOLISM W CONTRAST   Final Result   1. Negative for pulmonary embolus. 2. Extensive multifocal bilateral atypical pneumonia. XR CHEST PORTABLE   Final Result   Multifocal airspace disease, either due to pneumonia or edema.   Given the   ground-glass appearance, consider COVID-19 infection in the appropriate   clinical scenario               Assessment/Plan:    Active Hospital Problems    Diagnosis    Acute respiratory failure with hypoxemia ([facility] [address] [J96.01]    Pneumonia due to 2019 novel coronavirus [U07.1, J12.89]     Patient is a 67-year-old female who presented to hospital for shortness of breath    Assessment  COVID-19 pneumonia Acute hypoxic respiratory failure satting less than 90% on room air  Hypothyroidism  Obesity due to excess calories    Plan  Continue to wean down oxygen as tolerated, currently on 6 L nasal cannula  Continue Mucinex, Decadron, remdesivir was held due to transaminitis  Blood culture 1 bottle positive for coagulase negative staph, likely contamination  DVT prophylaxis on Lovenox  Likely discharge tomorrow after home oxygen evaluation  Patient will need home oxygen at discharge  Diet: DIET GENERAL;  Code Status: Full Code    PT/OT Eval Status: ordered    Dispo -likely discharge tomorrow early morning on home oxygen, monitor overnight for hypoxic events    Yue Olivas MD

## 2020-12-30 NOTE — PROGRESS NOTES
[facility]    Respiratory Therapy   Home Oxygen Evaluation        Name: Sheran Duane  Medical Record Number: [id number]  Age: 54 y.o. 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    Acute respiratory failure with hypoxemia (HCC)    Pneumonia due to 2019 novel coronavirus       Social History:  Social History     Tobacco Use    Smoking status: Former Smoker     Types: Cigarettes     Quit date:      Years since quittin.0    Smokeless tobacco: Never Used   Substance Use Topics    Alcohol use: Yes     Comment: occasionally    Drug use: Never       Patient Room Air saturation at rest 90  %  Patient Room Air saturation upon ambulation 86 %    Oxygen saturations of 88% or less on RA qualifies patient for Home Oxygen    Patient resting on 4  lmp  with an oxygen saturation of  94 %     Patient ambulated on 2 lpm with an oxygen saturation of 87%    Patient ambulated on 3 lpm with an oxygen saturation of 89%    Patient ambulated on 4 lpm with an oxygen saturation of 93%     Qualifying patient for home oxygen with ambulation and continuous flow  @ 4 lpm.      In your clinical assessment does the Patient Require Portable Oxygen Tanks?     Yes              Joseph Kraus from [address] [place] [address] contacted to follow care of patients home oxygen needs on 2020 at 3:14 PM    Patient/caregiver was educated on Home Oxygen process:  Yes      Level of patient/caregiver understanding able to:   [] Verbalize understanding   [] Demonstrate understanding       [] Teach back [] Needs reinforcement        []  No available caregiver               []  Other:     Response to education:  Good     Time Spent with Home O2 Set Up:  20  minutes     Corrinne Gaudier, RCP on 12/30/2020 at 3:14 PM

## 2020-12-30 NOTE — FLOWSHEET NOTE
Pt resting in bed with eyes closed and respirations easy and even. Appears to be sleeping at this time. Will continue to monitor. Call light in reach.

## 2020-12-30 NOTE — PROGRESS NOTES
Pt with continued headache throughout the day despite PRN tylenol given, message to Dr Maria G Guerrero to update. New order for one time dose of tramadol and if no relief with that to order a CT of the head. Pt updated.    Electronically signed by Dwain Duff RN on 12/29/2020 at 7:37 PM

## 2020-12-30 NOTE — FLOWSHEET NOTE
Pt continues with a headache. Order for CT of head if headache continues. Pt taken down for CT of head without contrast per W/C. Pt on 6 liters of O2 with transport and during CT. Pt  tolerated well.

## 2020-12-30 NOTE — PROGRESS NOTES
Nutrition Assessment     Type and Reason for Visit: Initial(LOS)    Nutrition Recommendations/Plan:   No nutrition related recommendations at this time. Nutrition Assessment:  LOS. Pt presented with SOB and fever. Pt found to be COVID+ and in isolation. Pt with good PO intakes since admit >75%. No nutrition related issues at this time. Malnutrition Assessment:  Malnutrition Status: No malnutrition    Nutrition Related Findings: BM 12/29; labs reviewed      Current Nutrition Therapies:    DIET GENERAL;     Anthropometric Measures:  · Height: 5' 5\" (165.1 cm)  · Current Body Wt: 223 lb (101.2 kg)   · BMI: 37.1    Nutrition Diagnosis:   No nutrition diagnosis at this time     Nutrition Interventions:   Food and/or Nutrient Delivery:  Continue Current Diet  Nutrition Education/Counseling:  No recommendation at this time   Coordination of Nutrition Care:  Continue to monitor while inpatient    Discharge Planning:    No discharge needs at this time     Electronically signed by Zoë Jerome RD, MALLORIE on 12/30/20 at 9:23 AM EST    Contact: [phone]

## 2020-12-30 NOTE — PLAN OF CARE
Problem: Airway Clearance - Ineffective  Goal: Achieve or maintain patent airway  12/30/2020 0038 by Karime Veliz RN  Outcome: Ongoing  12/29/2020 1152 by Roseanne Inman RN  Outcome: Ongoing  Note: Encouraged pt to prone and side lying. Problem: Gas Exchange - Impaired  Goal: Absence of hypoxia  Outcome: Ongoing  Goal: Promote optimal lung function  Outcome: Ongoing     Problem: Breathing Pattern - Ineffective  Goal: Ability to achieve and maintain a regular respiratory rate  12/30/2020 0038 by Karime Veliz RN  Outcome: Ongoing  12/29/2020 1152 by Roseanne Inman RN  Outcome: Ongoing  Note:       Problem:  Body Temperature -  Risk of, Imbalanced  Goal: Ability to maintain a body temperature within defined limits  Outcome: Ongoing  Goal: Will regain or maintain usual level of consciousness  Outcome: Ongoing  Goal: Complications related to the disease process, condition or treatment will be avoided or minimized  Outcome: Ongoing     Problem: Isolation Precautions - Risk of Spread of Infection  Goal: Prevent transmission of infection  Outcome: Ongoing     Problem: Nutrition Deficits  Goal: Optimize nutrtional status  Outcome: Ongoing     Problem: Risk for Fluid Volume Deficit  Goal: Maintain normal heart rhythm  Outcome: Ongoing  Goal: Maintain absence of muscle cramping  Outcome: Ongoing  Goal: Maintain normal serum potassium, sodium, calcium, phosphorus, and pH  Outcome: Ongoing     Problem: Loneliness or Risk for Loneliness  Goal: Demonstrate positive use of time alone when socialization is not possible  Outcome: Ongoing     Problem: Fatigue  Goal: Verbalize increase energy and improved vitality  Outcome: Ongoing     Problem: Patient Education: Go to Patient Education Activity  Goal: Patient/Family Education  Outcome: Ongoing     Problem: Falls - Risk of:  Goal: Will remain free from falls  Description: Will remain free from falls  Outcome: Ongoing  Goal: Absence of physical injury Description: Absence of physical injury  Outcome: Ongoing     Problem: Pain:  Goal: Pain level will decrease  Description: Pain level will decrease  12/30/2020 0038 by Tierra Goff RN  Outcome: Ongoing  12/29/2020 1152 by Laly Dan RN  Outcome: Ongoing  Note: Pain/discomfort being managed with PRN analgesics per MD orders. Pt able to express presence and absence of pain and rate pain appropriately using numerical scale.    Goal: Control of acute pain  Description: Control of acute pain  Outcome: Ongoing  Goal: Control of chronic pain  Description: Control of chronic pain  Outcome: Ongoing

## 2020-12-31 VITALS
SYSTOLIC BLOOD PRESSURE: 123 MMHG | DIASTOLIC BLOOD PRESSURE: 77 MMHG | HEART RATE: 75 BPM | HEIGHT: 65 IN | RESPIRATION RATE: 16 BRPM | TEMPERATURE: 98.3 F | WEIGHT: 217.37 LBS | BODY MASS INDEX: 36.22 KG/M2 | OXYGEN SATURATION: 100 %

## 2020-12-31 LAB
A/G RATIO: 0.9 (ref 1.1–2.2)
ALBUMIN SERPL-MCNC: 3.8 G/DL (ref 3.4–5)
ALP BLD-CCNC: 223 U/L (ref 40–129)
ALT SERPL-CCNC: 190 U/L (ref 10–40)
ANION GAP SERPL CALCULATED.3IONS-SCNC: 16 MMOL/L (ref 3–16)
AST SERPL-CCNC: 87 U/L (ref 15–37)
BASOPHILS ABSOLUTE: 0 K/UL (ref 0–0.2)
BASOPHILS RELATIVE PERCENT: 0.3 %
BILIRUB SERPL-MCNC: <0.2 MG/DL (ref 0–1)
BUN BLDV-MCNC: 14 MG/DL (ref 7–20)
C-REACTIVE PROTEIN: 13 MG/L (ref 0–5.1)
CALCIUM SERPL-MCNC: 9.4 MG/DL (ref 8.3–10.6)
CHLORIDE BLD-SCNC: 96 MMOL/L (ref 99–110)
CO2: 22 MMOL/L (ref 21–32)
CREAT SERPL-MCNC: <0.5 MG/DL (ref 0.6–1.1)
D DIMER: 616 NG/ML DDU (ref 0–229)
EOSINOPHILS ABSOLUTE: 0.1 K/UL (ref 0–0.6)
EOSINOPHILS RELATIVE PERCENT: 0.4 %
FIBRINOGEN: 467 MG/DL (ref 200–397)
GFR AFRICAN AMERICAN: >60
GFR NON-AFRICAN AMERICAN: >60
GLOBULIN: 4.2 G/DL
GLUCOSE BLD-MCNC: 97 MG/DL (ref 70–99)
HCT VFR BLD CALC: 36.6 % (ref 36–48)
HEMOGLOBIN: 11.9 G/DL (ref 12–16)
LYMPHOCYTES ABSOLUTE: 1.8 K/UL (ref 1–5.1)
LYMPHOCYTES RELATIVE PERCENT: 11.9 %
MCH RBC QN AUTO: 25.8 PG (ref 26–34)
MCHC RBC AUTO-ENTMCNC: 32.4 G/DL (ref 31–36)
MCV RBC AUTO: 79.5 FL (ref 80–100)
MONOCYTES ABSOLUTE: 1 K/UL (ref 0–1.3)
MONOCYTES RELATIVE PERCENT: 6.6 %
NEUTROPHILS ABSOLUTE: 12.2 K/UL (ref 1.7–7.7)
NEUTROPHILS RELATIVE PERCENT: 80.8 %
PDW BLD-RTO: 14.7 % (ref 12.4–15.4)
PLATELET # BLD: 747 K/UL (ref 135–450)
PMV BLD AUTO: 6.6 FL (ref 5–10.5)
POTASSIUM REFLEX MAGNESIUM: 4.1 MMOL/L (ref 3.5–5.1)
RBC # BLD: 4.61 M/UL (ref 4–5.2)
SODIUM BLD-SCNC: 134 MMOL/L (ref 136–145)
TOTAL PROTEIN: 8 G/DL (ref 6.4–8.2)
WBC # BLD: 15 K/UL (ref 4–11)

## 2020-12-31 PROCEDURE — 36415 COLL VENOUS BLD VENIPUNCTURE: CPT

## 2020-12-31 PROCEDURE — 86140 C-REACTIVE PROTEIN: CPT

## 2020-12-31 PROCEDURE — 85025 COMPLETE CBC W/AUTO DIFF WBC: CPT

## 2020-12-31 PROCEDURE — 6370000000 HC RX 637 (ALT 250 FOR IP): Performed by: NURSE PRACTITIONER

## 2020-12-31 PROCEDURE — 6370000000 HC RX 637 (ALT 250 FOR IP): Performed by: INTERNAL MEDICINE

## 2020-12-31 PROCEDURE — 85384 FIBRINOGEN ACTIVITY: CPT

## 2020-12-31 PROCEDURE — 80053 COMPREHEN METABOLIC PANEL: CPT

## 2020-12-31 PROCEDURE — 6360000002 HC RX W HCPCS: Performed by: INTERNAL MEDICINE

## 2020-12-31 PROCEDURE — 85379 FIBRIN DEGRADATION QUANT: CPT

## 2020-12-31 RX ORDER — GUAIFENESIN 600 MG/1
600 TABLET, EXTENDED RELEASE ORAL 2 TIMES DAILY
Qty: 28 TABLET | Refills: 0 | Status: SHIPPED | OUTPATIENT
Start: 2020-12-31 | End: 2021-01-14

## 2020-12-31 RX ORDER — GUAIFENESIN/DEXTROMETHORPHAN 100-10MG/5
5 SYRUP ORAL EVERY 4 HOURS PRN
Qty: 120 ML | Refills: 0 | Status: SHIPPED | OUTPATIENT
Start: 2020-12-31 | End: 2021-01-10

## 2020-12-31 RX ORDER — ZINC SULFATE 50(220)MG
50 CAPSULE ORAL DAILY
Qty: 30 CAPSULE | Refills: 3 | COMMUNITY
Start: 2021-01-01

## 2020-12-31 RX ORDER — DEXAMETHASONE 6 MG/1
6 TABLET ORAL DAILY
Qty: 6 TABLET | Refills: 0 | Status: SHIPPED | OUTPATIENT
Start: 2021-01-01 | End: 2021-01-07

## 2020-12-31 RX ADMIN — ZINC SULFATE 220 MG (50 MG) CAPSULE 50 MG: CAPSULE at 08:21

## 2020-12-31 RX ADMIN — LEVOTHYROXINE SODIUM 88 MCG: 0.09 TABLET ORAL at 08:21

## 2020-12-31 RX ADMIN — Medication 5000 UNITS: at 08:21

## 2020-12-31 RX ADMIN — BUTALBITAL, ACETAMINOPHEN, AND CAFFEINE 1 TABLET: 50; 325; 40 TABLET ORAL at 08:21

## 2020-12-31 RX ADMIN — GUAIFENESIN AND DEXTROMETHORPHAN 5 ML: 100; 10 SYRUP ORAL at 08:21

## 2020-12-31 RX ADMIN — GUAIFENESIN 600 MG: 600 TABLET ORAL at 08:22

## 2020-12-31 RX ADMIN — DEXAMETHASONE 6 MG: 4 TABLET ORAL at 08:21

## 2020-12-31 RX ADMIN — BENZONATATE 100 MG: 100 CAPSULE ORAL at 08:22

## 2020-12-31 NOTE — PROGRESS NOTES
CLINICAL PHARMACY NOTE: MEDS TO [address] Select Patient?: No  Total # of Prescriptions Filled: 3   The following medications were delivered to the patient:  · Dexamethasone 6mg  · Tussin-DM  · Mucinex 600mg  Total # of Interventions Completed: 0  Time Spent (min): 30    Additional Documentation:

## 2020-12-31 NOTE — ACP (ADVANCE CARE PLANNING)
Advance Care Planning     Advance Care Planning Activator (Inpatient)  Conversation Note      Date of ACP Conversation: 12/31/2020    Conversation Conducted with: Patient with Decision Making Capacity    ACP Activator: Michael Deal 112 Decision Maker:       If no Decision Maker listed above or available through scanned documents, then:    If no Authorized Decision Maker has previously been identified, then patient chooses Health Care Decision Maker:  \"Who would you like to name as your primary health care decision-maker? \"               Name: Jon Bedolla      Relationship: Spouse          Phone number: [phone]  Aaliyah Simmons this person be reached easily? \" Yes    Care Preferences    Ventilation: \"If you were in your present state of health and suddenly became very ill and were unable to breathe on your own, what would your preference be about the use of a ventilator (breathing machine) if it were available to you? \"      Would the patient desire the use of ventilator (breathing machine)?: yes    \"If your health worsens and it becomes clear that your chance of recovery is unlikely, what would your preference be about the use of a ventilator (breathing machine) if it were available to you? \"     Would the patient desire the use of ventilator (breathing machine)?: Yes      Resuscitation  \"CPR works best to restart the heart when there is a sudden event, like a heart attack, in someone who is otherwise healthy. Unfortunately, CPR does not typically restart the heart for people who have serious health conditions or who are very sick. \"    \"In the event your heart stopped as a result of an underlying serious health condition, would you want attempts to be made to restart your heart (answer \"yes\" for attempt to resuscitate) or would you prefer a natural death (answer \"no\" for do not attempt to resuscitate)? \" yes [] Yes   [x] No   Educated Patient / Crystal Jersey regarding differences between Advance Directives and portable DNR orders.     Length of ACP Conversation in minutes:  5 minutes  Conversation Outcomes:  [x] ACP discussion completed  [] Existing advance directive reviewed with patient; no changes to patient's previously recorded wishes  [] New Advance Directive completed  [] Portable Do Not Rescitate prepared for Provider review and signature  [] POLST/POST/MOLST/MOST prepared for Provider review and signature      Follow-up plan:    [] Schedule follow-up conversation to continue planning  [] Referred individual to Provider for additional questions/concerns   [] Advised patient/agent/surrogate to review completed ACP document and update if needed with changes in condition, patient preferences or care setting    [] This note routed to one or more involved healthcare providers - unable to route - no PCP on file, list given for patient to follow up with   Electronically signed by Noah Hopkins RN Case management on 12/31/2020 at 11:40 AM

## 2020-12-31 NOTE — DISCHARGE INSTR - COC
Continuity of Care Form    Patient Name: Mariana Gatica   :  1975  MRN:  [id number]    [address] [place] [address] date:  2020  Discharge date:  ***    Code Status Order: Full Code   Advance Directives:   Advance Care Flowsheet Documentation       Date/Time Healthcare Directive Type of Healthcare Directive Copy in [address] Agent's Name Healthcare Agent's Phone Number    [phone]  No, patient does not have an advance directive for healthcare treatment -- -- -- -- --            Admitting Physician:  Ange Oates MD  PCP: No primary care provider on file. Discharging Nurse: [facility] Unit/Room#: O9A-2977/5253-01  Discharging Unit Phone Number: ***    Emergency Contact:   Extended Emergency Contact Information  Primary Emergency Contact: [facility]  Address: [address] Phone: [phone]  Relation: Spouse  Secondary Emergency Contact: [facility]  Address: [address] [place] [address] Phone: [phone]  Relation: Spouse    Past Surgical History:  History reviewed. No pertinent surgical history.     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    Acute respiratory failure with hypoxemia (HCC) J96.01    Pneumonia due to 2019 novel coronavirus U07.1, J12.89       Isolation/Infection:   Isolation            Droplet Plus          Patient Infection Status       Infection Onset Added Last Indicated Last Indicated By Review Planned Expiration Resolved Resolved By COVID-19 12/26/20 12/26/20 12/26/20 COVID-19 01/02/21 01/09/21      Resolved    COVID-19 Rule Out 12/26/20 12/26/20 12/26/20 COVID-19 (Ordered)   12/26/20 Rule-Out Test Resulted            Nurse Assessment:  Last Vital Signs: /77   Pulse 75   Temp 98.3 °F (36.8 °C) (Oral)   Resp 16   Ht 5' 5\" (1.651 m)   Wt 217 lb 6 oz (98.6 kg)   SpO2 100%   BMI 36.17 kg/m²     Last documented pain score (0-10 scale): Pain Level: 3  Last Weight:   Wt Readings from Last 1 Encounters:   12/31/20 217 lb 6 oz (98.6 kg)     Mental Status:  {IP PT MENTAL STATUS:20030:::0}    IV Access:  { FABI IV ACCESS:695467311:::0}    Nursing Mobility/ADLs:  Walking   {CHP DME ADLs:324474533:::0}  Transfer  {CHP DME ADLs:210124510:::0}  Bathing  {CHP DME ADLs:255955804:::0}  Dressing  {CHP DME ADLs:405655651:::0}  Toileting  {CHP DME ADLs:599024147:::0}  Feeding  {CHP DME ADLs:458567106:::0}  Med Admin  {CHP DME ADLs:604481855:::0}  Med Delivery   { FABI MED Delivery:859956779:::0}    Wound Care Documentation and Therapy:        Elimination:  Continence: Bowel: {YES / HL:30980}  Bladder: {YES / VI:63983}  Urinary Catheter: {Urinary Catheter:730381873:::0}   Colostomy/Ileostomy/Ileal Conduit: {YES / DX:94908}       Date of Last BM: ***    Intake/Output Summary (Last 24 hours) at 12/31/2020 1018  Last data filed at 12/30/2020 1833  Gross per 24 hour   Intake 240 ml   Output 1800 ml   Net -1560 ml     I/O last 3 completed shifts:   In: 240 [P.O.:240]  Out: 1800 [Urine:1800]    Safety Concerns:     [address] Safety Concerns:962359708:::0}    Impairments/Disabilities:      [address] Impairments/Disabilities:497231614:::0}    Nutrition Therapy:  Current Nutrition Therapy:   [address] Diet List:295432428:::0}    Routes of Feeding: {CHP DME Other Feedings:473770190:::0}  Liquids: {Slp liquid thickness:54902}  Daily Fluid Restriction: {CHP DME Yes amt example:145401276:::0} Last Modified Barium Swallow with Video (Video Swallowing Test): {Done Not Done JLEU:354727276:::6}    Treatments at the Time of Hospital Discharge:   Respiratory Treatments: ***  Oxygen Therapy:  {Therapy; copd oxygen:12808:::0}  Ventilator:    {[facility] Vent List:018268288:::0}    Rehab Therapies: {THERAPEUTIC INTERVENTION:3220567889}  Weight Bearing Status/Restrictions: {[facility] Weight Bearin:::0}  Other Medical Equipment (for information only, NOT a DME order):  {EQUIPMENT:956931474}  Other Treatments: ***    Patient's personal belongings (please select all that are sent with patient):  {CHP DME Belongings:448587138:::0}    RN SIGNATURE:  {Esignature:619917037:::0}    CASE MANAGEMENT/SOCIAL WORK SECTION    Inpatient Status Date: ***    Readmission Risk Assessment Score:  Readmission Risk              Risk of Unplanned Readmission:        8           Discharging to Facility/ Agency   Name:   Address:  Phone:  Fax:    Dialysis Facility (if applicable)   Name:  Address:  Dialysis Schedule:  Phone:  Fax:    / signature: {Esignature:874982112:::0}    PHYSICIAN SECTION    Prognosis: Good    Condition at Discharge: Stable    Rehab Potential (if transferring to Rehab): Good    Recommended Labs or Other Treatments After Discharge: follow up with PCP and pulmonary in 1 week    Physician Certification: I certify the above information and transfer of Mariana Gatica  is necessary for the continuing treatment of the diagnosis listed and that she requires Home Care for greater 30 days.      Update Admission H&P: No change in H&P    PHYSICIAN SIGNATURE:  Electronically signed by Codie Sullivan MD on 20 at 10:18 AM EST

## 2020-12-31 NOTE — DISCHARGE SUMMARY
Hospital Medicine Discharge Summary    Patient ID: Jonny Number      Patient's PCP: No primary care provider on file. Admit Date: 12/26/2020     Discharge Date:      Admitting Physician: Ioana Vaca MD     Discharge Physician: Steve Yousif MD     Discharge Diagnoses: Active Hospital Problems    Diagnosis Date Noted    Acute respiratory failure with hypoxemia ([facility] [address] [J96.01] 12/26/2020    Pneumonia due to 2019 novel coronavirus [U07.1, J12.89] 12/26/2020       The patient was seen and examined on day of discharge and this discharge summary is in conjunction with any daily progress note from day of discharge. Condition at discharge - stable    Hospital Course: patient seen and evaluated on the day of discharge. Patient informed about following up with appointments. Patient verbalized understanding for follow-up appointments. All questions of the patient answered, patient is in agreement with the discharge plan. Medical reconciliation performed. Patient will be discharged stable condition. Patient is a 58-year-old female who presented to hospital for shortness of breath. Patient mentions she is quite active physically and walks frequently, mentions she does not prefer to be on blood thinners. Patient also mentions she will establish PCP and follow-up with pulmonary-referral given. Patient will be discharge stable condition. Patient to follow-up with PCP and pulmonary in 1 week.   Patient given chances for asking all questions, questions were answered, patient agreed with the discharge plan.     Assessment  COVID-19 pneumonia  Acute hypoxic respiratory failure satting less than 90% on room air  Hypothyroidism  Obesity due to excess calories         Exam:     /77   Pulse 75   Temp 98.3 °F (36.8 °C) (Oral)   Resp 16   Ht 5' 5\" (1.651 m)   Wt 217 lb 6 oz (98.6 kg)   SpO2 100%   BMI 36.17 kg/m²     General appearance: No apparent distress HEENT:  Conjunctivae/corneas clear. Neck: Supple, No jugular venous distention. Respiratory:  Normal respiratory effort. Clear to auscultation, bilaterally without Rales/Wheezes/Rhonchi. Cardiovascular: Regular rate and rhythm with normal S1/S2 without murmurs, rubs or gallops. Abdomen: Soft, non-tender, non-distended, normal bowel sounds. Musculoskelatal: No clubbing, cyanosis or edema bilaterally. Skin: Skin color, texture, turgor normal.   Neurologic: no focal neurologic deficits. grossly non-focal.  Psychiatric: Alert and oriented, normal mood    Consults:     None      Code Status:  Full Code    Activity: activity as tolerated    Labs:  For convenience and continuity at follow-up the following most recent labs are provided:      CBC:    Lab Results   Component Value Date    WBC 15.0 12/31/2020    HGB 11.9 12/31/2020    HCT 36.6 12/31/2020     12/31/2020       Renal:    Lab Results   Component Value Date     12/30/2020    K 4.0 12/30/2020     12/30/2020    CO2 25 12/30/2020    BUN 11 12/30/2020    CREATININE 0.5 12/30/2020    CALCIUM 8.6 12/30/2020       Discharge Medications:     Current Discharge Medication List           Details   dexamethasone (DECADRON) 6 MG tablet Take 1 tablet by mouth daily for 6 days  Qty: 6 tablet, Refills: 0      guaiFENesin (MUCINEX) 600 MG extended release tablet Take 1 tablet by mouth 2 times daily for 14 days  Qty: 28 tablet, Refills: 0      guaiFENesin-dextromethorphan (ROBITUSSIN DM) 100-10 MG/5ML syrup Take 5 mLs by mouth every 4 hours as needed for Cough  Qty: 120 mL, Refills: 0      zinc sulfate (ZINCATE) 220 (50 Zn) MG capsule Take 1 capsule by mouth daily  Qty: 30 capsule, Refills: 3      vitamin D (CHOLECALCIFEROL) 125 MCG (5000 UT) CAPS capsule Take 1 capsule by mouth daily for 14 days  Qty: 14 capsule, Refills: 0              Details albuterol sulfate HFA (VENTOLIN HFA) 108 (90 Base) MCG/ACT inhaler Inhale 2 puffs into the lungs every 6 hours as needed for Wheezing      benzonatate (TESSALON) 100 MG capsule Take 100 mg by mouth 3 times daily as needed for Cough      levothyroxine (SYNTHROID) 88 MCG tablet Take 88 mcg by mouth Daily      ibuprofen (IBU) 400 MG tablet Take 1 tablet by mouth every 6 hours as needed for Pain  Qty: 30 tablet, Refills: 0      loratadine (CLARITIN) 10 MG tablet Take 10 mg by mouth daily             Time Spent on discharge is more than 30 mints in the examination, evaluation, counseling and review of medications and discharge plan. Signed:    Tyree Melissa MD   12/31/2020      Thank you No primary care provider on file. for the opportunity to be involved in this patient's care. If you have any questions or concerns please feel free to contact me at [phone].

## 2021-01-01 RX ORDER — BUTALBITAL, ACETAMINOPHEN AND CAFFEINE 50; 325; 40 MG/1; MG/1; MG/1
1 TABLET ORAL EVERY 4 HOURS PRN
Qty: 10 TABLET | Refills: 0 | Status: SHIPPED | OUTPATIENT
Start: 2021-01-01 | End: 2021-01-06

## 2021-01-01 RX ORDER — LEVOTHYROXINE SODIUM 88 UG/1
88 TABLET ORAL DAILY
Qty: 30 TABLET | Refills: 0 | Status: SHIPPED | OUTPATIENT
Start: 2021-01-01 | End: 2021-01-31

## 2021-01-01 RX ORDER — BENZONATATE 100 MG/1
100 CAPSULE ORAL 3 TIMES DAILY PRN
Qty: 30 CAPSULE | Refills: 0 | Status: SHIPPED | OUTPATIENT
Start: 2021-01-01 | End: 2021-01-31

## 2021-01-01 NOTE — PROGRESS NOTES
Nurse called stating patient did not get 3 of her medications. Prescription for levothyroxine Fioricet and Tessalon Perles sent to [facility].     Guero Breen MD

## 2021-01-02 ENCOUNTER — CARE COORDINATION (OUTPATIENT)
Dept: CASE MANAGEMENT | Age: 46
End: 2021-01-02

## 2021-01-02 NOTE — CARE COORDINATION
[facility] Transitions Initial Follow Up Call    Call within 2 business days of discharge: Yes    Patient: Sumit Hernandez Patient : 1975   MRN: <[id number]>  Reason for Admission:   COVID-19+  Discharge Date: 20 RARS: Readmission Risk Score: 9      Last Discharge [address] [address]       Complaint Diagnosis Description Type Department Provider    20 Shortness of Breath COVID-19 virus infection . .. ED to Hosp-Admission (Discharged) (ADMITTED) Tony Gusman MD; Christi Sawyer. .. Follow Up:  COVID-19 Risk Monitoring:    COVID-19:  Detected 20      Attempted to reach patient for Care Transition follow up. No answer to phone. Message left with CTN contact information and request for call back.    Future Appointments   Date Time Provider Bryce Chatterjee   2021  9:15 AM OLIVA Marinelli RN

## 2021-01-04 ENCOUNTER — CARE COORDINATION (OUTPATIENT)
Dept: CASE MANAGEMENT | Age: 46
End: 2021-01-04

## 2021-01-04 NOTE — CARE COORDINATION
Date/Time:  1/4/2021 10:30 AM  Attempted to reach patient by telephone. Call within 2 business days of discharge: Yes Second attempt to reach patient, left hippa complain VM stating this was final attempt and for patient to call MD for any medical needs. Writer called [facility] to confirm oxygen delivery, spoke with Elsi Gallagher who stated needed to check on this and would call writer back. Writer spoke with Elsi Gallagher from [facility], she stated concentrator was left on porch of patient on 12-.

## 2021-01-27 NOTE — PROGRESS NOTES
REASON FOR CONSULTATION/CC:    Chief Complaint   Patient presents with    Follow-Up from [place] [address]            PCP: No primary care provider on file. HISTORY OF PRESENT ILLNESS: Jax Kuo is a 54y.o. year old female with a history of covid 23 who presents :     History  She was admitted in December for COVID-19 with pneumonia with extensive bilateral infiltrates. She was discharged on oxygen but was able to wean off over 1 week, after 2 weeks, dyspnea on exertion was improving. Continues to slowly improved but continued dyspnea on exertion (improve). Cough with dyspnea on exertion that is dry cough. Oxygen will decrease to 94% with exertion. SOCIAL HISTORY:   reports that she quit smoking about 12 years ago. Her smoking use included cigarettes. She has a 16.00 pack-year smoking history. She has never used smokeless tobacco.    PAST MEDICAL HISTORY:  History reviewed. No pertinent past medical history. PAST SURGICAL HISTORY:  History reviewed. No pertinent surgical history. FAMILY HISTORY:  family history includes Cancer in her father. Objective:   PHYSICAL EXAM:  Blood pressure 136/86, pulse 84, temperature 98 °F (36.7 °C), temperature source Infrared, resp. rate 16, height 5' 6\" (1.676 m), weight 218 lb 9.6 oz (99.2 kg), last menstrual period 01/21/2021, SpO2 97 %, not currently breastfeeding.'  Body mass index is 35.28 kg/m². Gen: No distress. Eyes: PERRL. No sclera icterus. No conjunctival injection. ENT: No discharge. Pharynx clear. External appearance of ears and nose normal.  Neck: Trachea midline. No obvious mass. Resp: No accessory muscle use. No crackles. No wheezes. No rhonchi. CV: Regular rate. Regular rhythm. No murmur or rub. No edema. GI:    Skin: Warm, dry, normal texture and turgor. No nodule on exposed extremities. Lymph: No cervical LAD. No supraclavicular LAD. M/S: No cyanosis. No clubbing. No joint deformity. Neuro: Moves all four extremities. Psych: Oriented x 3. No anxiety. Awake. Alert. Intact judgement and insight. Current Outpatient Medications   Medication Sig Dispense Refill    VITAMIN D PO Take by mouth daily      levothyroxine (SYNTHROID) 88 MCG tablet Take 1 tablet by mouth Daily 30 tablet 0    zinc sulfate (ZINCATE) 220 (50 Zn) MG capsule Take 1 capsule by mouth daily 30 capsule 3    albuterol sulfate HFA (VENTOLIN HFA) 108 (90 Base) MCG/ACT inhaler Inhale 2 puffs into the lungs every 6 hours as needed for Wheezing      ibuprofen (IBU) 400 MG tablet Take 1 tablet by mouth every 6 hours as needed for Pain 30 tablet 0    loratadine (CLARITIN) 10 MG tablet Take 10 mg by mouth daily      benzonatate (TESSALON) 100 MG capsule Take 1 capsule by mouth 3 times daily as needed for Cough (Patient not taking: Reported on 1/28/2021) 30 capsule 0    butalbital-acetaminophen-caffeine (FIORICET, ESGIC) -40 MG per tablet Take 1 tablet by mouth every 4 hours as needed for Headaches 10 tablet 0     No current facility-administered medications for this visit. Data Reviewed:     Category 1 Data points:      Last CBC  Lab Results   Component Value Date    WBC 15.0 12/31/2020    RBC 4.61 12/31/2020    HGB 11.9 12/31/2020    MCV 79.5 12/31/2020     12/31/2020     Last Renal  Lab Results   Component Value Date     12/31/2020    K 4.1 12/31/2020    CL 96 12/31/2020    CO2 22 12/31/2020    CO2 25 12/30/2020    CO2 22 12/29/2020    BUN 14 12/31/2020    CREATININE <0.5 12/31/2020    GLUCOSE 97 12/31/2020    CALCIUM 9.4 12/31/2020       Last ABG  POC Blood Gas: No results found for: POCPH, POCPCO2, POCPO2, POCHCO3, NBEA, VWES5ODC  No results for input(s): PH, PCO2, PO2, HCO3, BE, O2SAT in the last 72 hours. Notes Reviewed: DC summary reviewed see history. Treated for COVID-19. Radiology Review:  Pertinent images / reports were reviewed as a part of this visit. CT Chest w/ contrast: No results found for this or any previous visit. CT Chest w/o contrast: No results found for this or any previous visit. CTPA:   Results for orders placed during the hospital encounter of 12/26/20   CT CHEST PULMONARY EMBOLISM W CONTRAST    Narrative EXAMINATION:  CTA OF THE CHEST 12/26/2020 8:20 am    TECHNIQUE:  CTA of the chest was performed after the administration of intravenous  contrast.  Multiplanar reformatted images are provided for review. MIP  images are provided for review. Dose modulation, iterative reconstruction,  and/or weight based adjustment of the mA/kV was utilized to reduce the  radiation dose to as low as reasonably achievable. COMPARISON:  None. HISTORY:  ORDERING SYSTEM PROVIDED HISTORY: sob  TECHNOLOGIST PROVIDED HISTORY:  Reason for exam:->sob  Reason for Exam: sob, coughing    FINDINGS:  Pulmonary Arteries: Pulmonary arteries are adequately opacified for  evaluation. No evidence of intraluminal filling defect to suggest pulmonary  embolism. Main pulmonary artery is normal in caliber. Mediastinum: No evidence of mediastinal lymphadenopathy. The heart and  pericardium demonstrate no acute abnormality. There is no acute abnormality  of the thoracic aorta. Lungs/pleura: There are multifocal bilateral confluency ground-glass  opacities throughout the lungs. There is no pneumothorax or effusion. Upper Abdomen: Limited images of the upper abdomen are unremarkable. Soft Tissues/Bones: No acute bone or soft tissue abnormality. Impression 1. Negative for pulmonary embolus. 2. Extensive multifocal bilateral atypical pneumonia. CXR PA/LAT: No results found for this or any previous visit. CXR portable:   Results for orders placed during the hospital encounter of 12/26/20   XR CHEST PORTABLE    Narrative EXAMINATION:  ONE XRAY VIEW OF THE CHEST    12/26/2020 8:21 am    COMPARISON:  None.     HISTORY: ORDERING SYSTEM PROVIDED HISTORY: sob  TECHNOLOGIST PROVIDED HISTORY:  Reason for exam:->sob  Reason for Exam: sob, cough  Acuity: Acute  Type of Exam: Initial    FINDINGS:  Heart size is normal.  Scattered patchy parenchymal opacities are seen  throughout the lungs bilaterally, right greater than left. These appears  somewhat ground-glass in appearance. Impression Multifocal airspace disease, either due to pneumonia or edema. Given the  ground-glass appearance, consider COVID-19 infection in the appropriate  clinical scenario             Total labs reviewed 3+    Category 2 Data points:    Radiology Review:  Independent interpretation of diffuse groundglass opacities consistent with COVID-19    Category 3 Data points:    Discussed management or interpretation of test with external provider: 0            Assessment:     COVID-19 pneumonia  Acute hypoxemic respiratory failure, resolved  Diagnosis of hypothyroidism with recent TSH of 6.2  Obesity      Plan:      Problem List Items Addressed This Visit     Pneumonia due to 2019 novel coronavirus     Significant pulmonary opacities seen on chest x-ray and CT chest.  This was reviewed with the patient. She expressed understanding. She is improving significantly with increasing activity, weaned off oxygen, with improving cough. Therefore, she was advised to continue a exercise program to improve endurance. Expressed understanding for this. Obesity (BMI 30-39. 9)     We discussed the benefits of weight loss. Advised her to download application that allowed her to keep track of her calories. She expressed understanding for this. She will continue exercise.          Hypothyroidism (acquired) - Primary She has a diagnosis of hypothyroidism that appears to be diagnosed in March 2017 with a TSH of 6.2. She had stopped her Synthroid for 5 months prior to admission for COVID-19. TSH was normal on admission. Therefore, is not clear that she needs her Synthroid. Therefore, she was advised to stop this medication and repeat TSH with reflex in 1 month. Relevant Orders    TSH with Reflex    Acute respiratory failure with hypoxemia (HCC)     Acute hypoxemia secondary to COVID-19. Current saturation is 97%. She monitors at home with a genaro saturation of 94% with exertion. Therefore, she no longer needs oxygen and a discontinuation order will be sent to [facility]. This note was transcribed using [id number] 4vets. Please disregard any translational errors.     [facility] [phone]

## 2021-01-28 ENCOUNTER — OFFICE VISIT (OUTPATIENT)
Dept: PULMONOLOGY | Age: 46
End: 2021-01-28
Payer: COMMERCIAL

## 2021-01-28 VITALS
TEMPERATURE: 98 F | WEIGHT: 218.6 LBS | RESPIRATION RATE: 16 BRPM | DIASTOLIC BLOOD PRESSURE: 86 MMHG | BODY MASS INDEX: 35.13 KG/M2 | OXYGEN SATURATION: 97 % | HEART RATE: 84 BPM | SYSTOLIC BLOOD PRESSURE: 136 MMHG | HEIGHT: 66 IN

## 2021-01-28 DIAGNOSIS — U07.1 PNEUMONIA DUE TO 2019 NOVEL CORONAVIRUS: ICD-10-CM

## 2021-01-28 DIAGNOSIS — E03.9 HYPOTHYROIDISM (ACQUIRED): Primary | ICD-10-CM

## 2021-01-28 DIAGNOSIS — J96.01 ACUTE RESPIRATORY FAILURE WITH HYPOXEMIA (HCC): ICD-10-CM

## 2021-01-28 DIAGNOSIS — E66.9 OBESITY (BMI 30-39.9): ICD-10-CM

## 2021-01-28 DIAGNOSIS — J12.82 PNEUMONIA DUE TO 2019 NOVEL CORONAVIRUS: ICD-10-CM

## 2021-01-28 PROCEDURE — G8484 FLU IMMUNIZE NO ADMIN: HCPCS | Performed by: INTERNAL MEDICINE

## 2021-01-28 PROCEDURE — 99204 OFFICE O/P NEW MOD 45 MIN: CPT | Performed by: INTERNAL MEDICINE

## 2021-01-28 PROCEDURE — G8417 CALC BMI ABV UP PARAM F/U: HCPCS | Performed by: INTERNAL MEDICINE

## 2021-01-28 PROCEDURE — 1036F TOBACCO NON-USER: CPT | Performed by: INTERNAL MEDICINE

## 2021-01-28 PROCEDURE — 1111F DSCHRG MED/CURRENT MED MERGE: CPT | Performed by: INTERNAL MEDICINE

## 2021-01-28 PROCEDURE — G8427 DOCREV CUR MEDS BY ELIG CLIN: HCPCS | Performed by: INTERNAL MEDICINE

## 2021-01-28 NOTE — ASSESSMENT & PLAN NOTE
We discussed the benefits of weight loss. Advised her to download application that allowed her to keep track of her calories. She expressed understanding for this. She will continue exercise.

## 2021-01-28 NOTE — ASSESSMENT & PLAN NOTE
Significant pulmonary opacities seen on chest x-ray and CT chest.  This was reviewed with the patient. She expressed understanding. She is improving significantly with increasing activity, weaned off oxygen, with improving cough. Therefore, she was advised to continue a exercise program to improve endurance. Expressed understanding for this.

## 2021-01-28 NOTE — ASSESSMENT & PLAN NOTE
Acute hypoxemia secondary to COVID-19. Current saturation is 97%. She monitors at home with a genaro saturation of 94% with exertion. Therefore, she no longer needs oxygen and a discontinuation order will be sent to [facility].

## 2021-01-28 NOTE — ASSESSMENT & PLAN NOTE
She has a diagnosis of hypothyroidism that appears to be diagnosed in March 2017 with a TSH of 6.2. She had stopped her Synthroid for 5 months prior to admission for COVID-19. TSH was normal on admission. Therefore, is not clear that she needs her Synthroid. Therefore, she was advised to stop this medication and repeat TSH with reflex in 1 month.

## 2021-08-31 NOTE — PLAN OF CARE
Problem: Airway Clearance - Ineffective  Goal: Achieve or maintain patent airway  12/28/2020 0219 by Rivera Lopez RN  Outcome: Met This Shift  12/27/2020 1336 by Armida Esteves RN  Outcome: Ongoing     Problem: Gas Exchange - Impaired  Goal: Absence of hypoxia  12/28/2020 0219 by Rivera Lopez RN  Outcome: Met This Shift     Problem: Body Temperature -  Risk of, Imbalanced  Goal: Ability to maintain a body temperature within defined limits  12/28/2020 0219 by Rivera Lopez RN  Outcome: Met This Shift     Problem:  Body Temperature -  Risk of, Imbalanced  Goal: Will regain or maintain usual level of consciousness  12/28/2020 0219 by Rivera Lopez RN  Outcome: Met This Shift     Problem: Nutrition Deficits  Goal: Optimize nutrtional status  12/28/2020 0219 by Rivera Lopez RN  Outcome: Met This Shift     Problem: Loneliness or Risk for Loneliness  Goal: Demonstrate positive use of time alone when socialization is not possible  12/28/2020 0219 by Rivera Lopez RN  Outcome: Met This Shift     Problem: Gas Exchange - Impaired  Goal: Promote optimal lung function  12/28/2020 0219 by Rivera Lopez RN  Outcome: Ongoing     Problem: Pain:  Goal: Control of acute pain  12/28/2020 0219 by Rivera Lopez RN  Outcome: Ongoing ADMIT

## 2022-01-13 ENCOUNTER — HOSPITAL ENCOUNTER (OUTPATIENT)
Dept: MAMMOGRAPHY | Age: 47
Discharge: HOME | End: 2022-01-18
Payer: COMMERCIAL

## 2022-01-13 VITALS — BODY MASS INDEX: 35.82 KG/M2 | HEIGHT: 65 IN | WEIGHT: 215 LBS

## 2022-01-13 DIAGNOSIS — Z12.31 VISIT FOR SCREENING MAMMOGRAM: ICD-10-CM

## 2022-01-13 PROCEDURE — 77067 SCR MAMMO BI INCL CAD: CPT

## 2022-10-04 NOTE — CARE COORDINATION
INITIAL CASE MANAGEMENT ASSESSMENT    Reviewed chart, unable to meet with patient due to isolation status. Call to patient's room, spoke with patient over the phone to assess possible discharge needs. Explained Case Management role/services. Living Situation: Confirmed address, lives in 1 story house w/ spouse and 2 kids (24yo and 16yo)    ADLs: Independent     DME: None    PT/OT Recs: Not ordered, UAL in room     Active Services: None     Transportation: Active ,  to transport home     Medications: Uses [employer] in [facility] - no trouble purchasing meds    PCP: No PCP - list given to patient for follow up    PLAN/COMMENTS: Patient plans on returning home at discharge. Denies needing help at home. Possible discharge today. Patient mentions MD says she'll need a nebulizer and oxygen at discharge. DME order for O2 received, LM w/ Brady Rower w/ [employer]. Unsure about nebulizer, explained patient's w/ COVID do not typically get nebulizer treatments. Told patient to ask MD. No additional needs to note. CM provided contact information for patient or family to call with any questions. CM will follow and assist as needed.   Electronically signed by Merlinda Endow, RN Case Management [phone] on 12/31/2020 at 10:22 AM
[employer] rep delivered Dual E-tank to outside the patient's room. RN to deliver E-tanks to the patient and review equipment use. [facility] rep attempted to contact patient several times, but patient is not answering her phone. Written info on equipment set up, supply reorder process, oxygen safety, and Home O2 delivery & rental process was provided to the patient. Patient will need to call [employer] at [phone] when leaving hospital in order to activate delivery of Concentrator to pt's home.     Thank you for the referral.  Electronically signed by Geronimo Low on 12/31/2020 at 11:13 AM Cell ph# [phone]
Rachel/Natalee received referral from RT for HOME OXYGEN      Will need DME ORDERS     [employer] rep will verify patient's insurance and once DME Orders are received, Rachel/[employer] rep will follow up with patient prior to discharge to deliver E-tank.     Thank you for the referral.  Electronically signed by Hunter Peña on 12/30/2020 at 3:26 PM  Cell ph# [phone]
Unable to reach patient in room for initial assessment and ACP, patient not answering when I try to call room phone. Will try again as timing permits.  Electronically signed by Abby Valentin RN Case Management [phone] on 12/28/2020 at 2:33 PM
Neha HILTON

## 2023-01-18 ENCOUNTER — E-VISIT (OUTPATIENT)
Dept: OTHER | Facility: CLINIC | Age: 48
End: 2023-01-18
Payer: COMMERCIAL

## 2023-01-18 DIAGNOSIS — R23.9 SKIN CHANGE: Primary | ICD-10-CM

## 2023-01-18 PROCEDURE — 99422 OL DIG E/M SVC 11-20 MIN: CPT | Performed by: NURSE PRACTITIONER

## 2023-01-18 NOTE — PROGRESS NOTES
Reviewed questionnaire and photo    Reviewed meds/allergies    Dx Skin Change    Plan Unable to treat via evisit, recommend in patient eval with PCP or dermatology.  Patient has specific request for referral to derm    Time spent on visit 11 min